# Patient Record
Sex: FEMALE | Race: WHITE | NOT HISPANIC OR LATINO | Employment: FULL TIME | URBAN - METROPOLITAN AREA
[De-identification: names, ages, dates, MRNs, and addresses within clinical notes are randomized per-mention and may not be internally consistent; named-entity substitution may affect disease eponyms.]

---

## 2017-02-01 DIAGNOSIS — N80.9 ENDOMETRIOSIS: Primary | ICD-10-CM

## 2017-02-01 DIAGNOSIS — E03.9 HYPOTHYROIDISM: ICD-10-CM

## 2017-02-01 DIAGNOSIS — Z13.6 CARDIOVASCULAR SCREENING; LDL GOAL LESS THAN 160: ICD-10-CM

## 2017-02-01 DIAGNOSIS — I10 HTN (HYPERTENSION): ICD-10-CM

## 2017-02-01 RX ORDER — HYDROCHLOROTHIAZIDE 25 MG/1
25 TABLET ORAL DAILY
Qty: 90 TABLET | Refills: 0 | Status: SHIPPED | OUTPATIENT
Start: 2017-02-01 | End: 2017-06-21

## 2017-02-01 NOTE — TELEPHONE ENCOUNTER
ethynodiol-ethinyl estradiol (ZOVIA 1/35E, 28,) 1-35 MG-MCG per tablet      Last Written Prescription Date: 6/16/16  Last Fill Quantity: 112,  # refills: 2   Last Office Visit with [facility], P or [facility] prescribing provider: 2/22/16                                         Next 5 appointments (look out 90 days)     Mar 08, 2017  8:30 AM   [facility]    [address] [facility] [address] [phone]

## 2017-02-01 NOTE — TELEPHONE ENCOUNTER
hydrochlorothiazide (HYDRODIURIL) 25 MG tablet      Last Written Prescription Date: 8/22/16  Last Fill Quantity: 90, # refills: 1  Last Office Visit with [facility], [facility] or [facility] prescribing provider: 2/22/16   Next 5 appointments (look out 90 days)     Mar 08, 2017  8:30 AM   [facility]    [address] [address]   [phone]                   POTASSIUM   Date Value Ref Range Status   02/22/2016 3.6 3.4 - 5.3 mmol/L Final     CREATININE   Date Value Ref Range Status   02/22/2016 0.92 0.52 - 1.04 mg/dL Final     BP Readings from Last 3 Encounters:   04/18/16 148/80   02/22/16 127/83   02/11/15 120/82

## 2017-02-01 NOTE — TELEPHONE ENCOUNTER
HYDROCHLOROTHIAZIDE Medication is being filled for 1 time refill only due to: Patient will be due for fasting lab. Patient has 3/8/17 appointment with Dr. Sanz. Routing    ethynodiol-ethinyl estradiol      refill request to provider for review/approval because:  I am not sure so would like Provider to fill.  Ari Brown RN

## 2017-02-02 RX ORDER — ETHYNODIOL DIACETATE AND ETHINYL ESTRADIOL 1 MG-35MCG
KIT ORAL
Qty: 112 TABLET | Refills: 0 | Status: SHIPPED | OUTPATIENT
Start: 2017-02-02 | End: 2017-11-13

## 2017-02-27 DIAGNOSIS — E03.9 HYPOTHYROIDISM: Primary | ICD-10-CM

## 2017-02-27 NOTE — TELEPHONE ENCOUNTER
levothyroxine (LEVOXYL) 50 MCG tablet     Last Written Prescription Date: 3/23/16  Last Quantity: 90, # refills: 3  Last Office Visit with G, P or [facility] prescribing provider: 2/22/16   Next 5 appointments (look out 90 days)     Mar 08, 2017  8:30 AM CST   PHYSICAL with Nancy Sanz MD   [facility]    [address] [facility] [address]   [phone]                   TSH   Date Value Ref Range Status   02/22/2016 1.24 0.40 - 4.00 mU/L Final

## 2017-02-28 RX ORDER — LEVOTHYROXINE SODIUM 50 UG/1
50 TABLET ORAL DAILY
Qty: 90 TABLET | Refills: 0 | Status: SHIPPED | OUTPATIENT
Start: 2017-02-28 | End: 2017-05-29

## 2017-02-28 NOTE — TELEPHONE ENCOUNTER
Medication is being filled for 1 time refill only due to:  Patient needs labs thyroid, lipids, . Future labs ordered yes. Patient needs to be seen because it has been more than one year since last visit. Needs bmp and microalbumin too. Has 3/8/17 appointment scheduled with Dr. Sanz.  Ari Brown RN

## 2017-03-08 ENCOUNTER — TRANSFERRED RECORDS (OUTPATIENT)
Dept: HEALTH INFORMATION MANAGEMENT | Facility: CLINIC | Age: 52
End: 2017-03-08

## 2017-03-08 ENCOUNTER — OFFICE VISIT (OUTPATIENT)
Dept: FAMILY MEDICINE | Facility: CLINIC | Age: 52
End: 2017-03-08
Payer: COMMERCIAL

## 2017-03-08 VITALS
TEMPERATURE: 98.6 F | WEIGHT: 125 LBS | HEIGHT: 65 IN | HEART RATE: 67 BPM | SYSTOLIC BLOOD PRESSURE: 111 MMHG | BODY MASS INDEX: 20.83 KG/M2 | DIASTOLIC BLOOD PRESSURE: 77 MMHG

## 2017-03-08 DIAGNOSIS — Z00.00 ROUTINE GENERAL MEDICAL EXAMINATION AT A HEALTH CARE FACILITY: Primary | ICD-10-CM

## 2017-03-08 DIAGNOSIS — Z12.4 SCREENING FOR MALIGNANT NEOPLASM OF CERVIX: ICD-10-CM

## 2017-03-08 DIAGNOSIS — Z11.59 NEED FOR HEPATITIS C SCREENING TEST: ICD-10-CM

## 2017-03-08 DIAGNOSIS — I10 ESSENTIAL HYPERTENSION: ICD-10-CM

## 2017-03-08 DIAGNOSIS — E03.9 ACQUIRED HYPOTHYROIDISM: ICD-10-CM

## 2017-03-08 DIAGNOSIS — Z13.220 SCREENING FOR LIPID DISORDERS: ICD-10-CM

## 2017-03-08 DIAGNOSIS — Z12.31 VISIT FOR SCREENING MAMMOGRAM: ICD-10-CM

## 2017-03-08 LAB
ANION GAP SERPL CALCULATED.3IONS-SCNC: 6 MMOL/L (ref 3–14)
BUN SERPL-MCNC: 11 MG/DL (ref 7–30)
CALCIUM SERPL-MCNC: 8.6 MG/DL (ref 8.5–10.1)
CHLORIDE SERPL-SCNC: 98 MMOL/L (ref 94–109)
CHOLEST SERPL-MCNC: 187 MG/DL
CO2 SERPL-SCNC: 30 MMOL/L (ref 20–32)
CREAT SERPL-MCNC: 0.86 MG/DL (ref 0.52–1.04)
CREAT UR-MCNC: 57 MG/DL
GFR SERPL CREATININE-BSD FRML MDRD: 70 ML/MIN/1.7M2
GLUCOSE SERPL-MCNC: 87 MG/DL (ref 70–99)
HDLC SERPL-MCNC: 71 MG/DL
LDLC SERPL CALC-MCNC: 96 MG/DL
MICROALBUMIN UR-MCNC: <5 MG/L
MICROALBUMIN/CREAT UR: NORMAL MG/G CR (ref 0–25)
NONHDLC SERPL-MCNC: 116 MG/DL
POTASSIUM SERPL-SCNC: 3.1 MMOL/L (ref 3.4–5.3)
SODIUM SERPL-SCNC: 134 MMOL/L (ref 133–144)
TRIGL SERPL-MCNC: 99 MG/DL
TSH SERPL DL<=0.005 MIU/L-ACNC: 1.48 MU/L (ref 0.4–4)

## 2017-03-08 PROCEDURE — 82043 UR ALBUMIN QUANTITATIVE: CPT | Performed by: FAMILY MEDICINE

## 2017-03-08 PROCEDURE — 99396 PREV VISIT EST AGE 40-64: CPT | Performed by: FAMILY MEDICINE

## 2017-03-08 PROCEDURE — 80061 LIPID PANEL: CPT | Performed by: FAMILY MEDICINE

## 2017-03-08 PROCEDURE — 80048 BASIC METABOLIC PNL TOTAL CA: CPT | Performed by: FAMILY MEDICINE

## 2017-03-08 PROCEDURE — 87624 HPV HI-RISK TYP POOLED RSLT: CPT | Performed by: FAMILY MEDICINE

## 2017-03-08 PROCEDURE — 84443 ASSAY THYROID STIM HORMONE: CPT | Performed by: FAMILY MEDICINE

## 2017-03-08 PROCEDURE — G0145 SCR C/V CYTO,THINLAYER,RESCR: HCPCS | Performed by: FAMILY MEDICINE

## 2017-03-08 PROCEDURE — 36415 COLL VENOUS BLD VENIPUNCTURE: CPT | Performed by: FAMILY MEDICINE

## 2017-03-08 NOTE — MR AVS SNAPSHOT
After Visit Summary   3/8/2017    Carie Samson    MRN: [id number]           Patient Information     Date Of Birth          1965        Visit Information        Provider Department      3/8/2017 8:30 AM Nancy Sanz MD [facility]        Today's Diagnoses     Routine general medical examination at a health care facility    -  1    Visit for screening mammogram        Screening for malignant neoplasm of cervix        Need for hepatitis C screening test        Essential hypertension        Acquired hypothyroidism        Screening for lipid disorders          Care Instructions      Preventive Health Recommendations  Female Ages 50 - 64    Yearly exam: See your health care provider every year in order to  o Review health changes.   o Discuss preventive care.    o Review your medicines if your doctor has prescribed any.      Get a Pap test every three years (unless you have an abnormal result and your provider advises testing more often).    If you get Pap tests with HPV test, you only need to test every 5 years, unless you have an abnormal result.     You do not need a Pap test if your uterus was removed (hysterectomy) and you have not had cancer.    You should be tested each year for STDs (sexually transmitted diseases) if you're at risk.     Have a mammogram every 1 to 2 years.    Have a colonoscopy at age 50, or have a yearly FIT test (stool test). These exams screen for colon cancer.      Have a cholesterol test every 5 years, or more often if advised.    Have a diabetes test (fasting glucose) every three years. If you are at risk for diabetes, you should have this test more often.     If you are at risk for osteoporosis (brittle bone disease), think about having a bone density scan (DEXA).    Shots: Get a flu shot each year. Get a tetanus shot every 10 years.    Nutrition:     Eat at least 5 servings of fruits and vegetables each day.    Eat whole-grain bread, whole-wheat  pasta and brown rice instead of white grains and rice.    Talk to your provider about Calcium and Vitamin D.     Lifestyle    Exercise at least 150 minutes a week (30 minutes a day, 5 days a week). This will help you control your weight and prevent disease.    Limit alcohol to one drink per day.    No smoking.     Wear sunscreen to prevent skin cancer.     See your dentist every six months for an exam and cleaning.    See your eye doctor every 1 to 2 years.        Go off the birth control pills and see how things are going. Send me a mychart if you feel you need to go back on something. I would recommend trying hormone replacement to see if that is enough to control the symptoms as it is a lower dose of medication.  If the blood pressure goes down off of the pills then you can try going off the blood pressure med. If the blood pressure goes back up though, please restart the medication.         Follow-ups after your visit        Future tests that were ordered for you today     Open Future Orders        Priority Expected Expires Ordered    MA SCREENING DIGITAL BILAT - Future  (s+30) Routine  3/8/2018 3/8/2017            Who to contact     Normal or non-critical lab and imaging results will be communicated to you by ClickDiagnosticshart, letter or phone within 4 business days after the clinic has received the results. If you do not hear from us within 7 days, please contact the clinic through Betifyt or phone. If you have a critical or abnormal lab result, we will notify you by phone as soon as possible.  Submit refill requests through Bucmi or call your pharmacy and they will forward the refill request to us. Please allow 3 business days for your refill to be completed.          If you need to speak with a  for additional information , please call: [phone]             Additional Information About Your Visit        Bucmi Information     Bucmi gives you secure access to your electronic health record. If  you see a primary care provider, you can also send messages to your care team and make appointments. If you have questions, please call your primary care clinic.  If you do not have a primary care provider, please call [phone] and they will assist you.        Care EveryWhere ID     This is your Care EveryWhere ID. This could be used by other organizations to access your [facility] medical records  [id number]         Blood Pressure from Last 3 Encounters:   04/18/16 148/80   02/22/16 127/83   02/11/15 120/82    Weight from Last 3 Encounters:   04/18/16 125 lb (56.7 kg)   02/22/16 130 lb 4.8 oz (59.1 kg)   02/11/15 130 lb (59 kg)              We Performed the Following     Albumin Random Urine Quantitative     BASIC METABOLIC PANEL     HPV High Risk Types DNA Cervical     Lipid panel reflex to direct LDL     Pap imaged thin layer screen with HPV - recommended age 30 - 65 years (select HPV order below)     TSH with free T4 reflex        Primary Care Provider Office Phone # Fax #    Nancy Sanz [phone]       [facility] [address] [facility] [address]        Thank you!     Thank you for choosing [facility]  for your care. Our goal is always to provide you with excellent care. Hearing back from our patients is one way we can continue to improve our services. Please take a few minutes to complete the written survey that you may receive in the mail after your visit with us. Thank you!             Your Updated Medication List - Protect others around you: Learn how to safely use, store and throw away your medicines at www.disposemymeds.org.          This list is accurate as of: 3/8/17  9:27 AM.  Always use your most recent med list.                   Brand Name Dispense Instructions for use    aspirin 81 MG tablet      1 TABLET DAILY       CITRACAL OR      1 tablet daily       CLARITIN PO      Take  by mouth daily.       ethynodiol-ethinyl estradiol 1-35 MG-MCG per  tablet    ZOVIA 1/35E (66)    112 tablet    Take  by mouth daily. Continuously       hydrochlorothiazide 25 MG tablet    HYDRODIURIL    90 tablet    Take 1 tablet (25 mg) by mouth daily       levothyroxine 50 MCG tablet    LEVOXYL    90 tablet    Take 1 tablet (50 mcg) by mouth daily

## 2017-03-08 NOTE — NURSING NOTE
"Chief Complaint   Patient presents with     Physical       Initial /77 (BP Location: Right arm, Cuff Size: Adult Regular)  Pulse 67  Temp 98.6  F (37  C) (Tympanic)  Ht 5' 5\" (1.651 m)  Wt 125 lb (56.7 kg)  BMI 20.8 kg/m2 Estimated body mass index is 20.8 kg/(m^2) as calculated from the following:    Height as of this encounter: 5' 5\" (1.651 m).    Weight as of this encounter: 125 lb (56.7 kg).  Medication Reconciliation: complete     Lizzy Glass CMA    "

## 2017-03-08 NOTE — PATIENT INSTRUCTIONS
Preventive Health Recommendations  Female Ages 50 - 64    Yearly exam: See your health care provider every year in order to  o Review health changes.   o Discuss preventive care.    o Review your medicines if your doctor has prescribed any.      Get a Pap test every three years (unless you have an abnormal result and your provider advises testing more often).    If you get Pap tests with HPV test, you only need to test every 5 years, unless you have an abnormal result.     You do not need a Pap test if your uterus was removed (hysterectomy) and you have not had cancer.    You should be tested each year for STDs (sexually transmitted diseases) if you're at risk.     Have a mammogram every 1 to 2 years.    Have a colonoscopy at age 50, or have a yearly FIT test (stool test). These exams screen for colon cancer.      Have a cholesterol test every 5 years, or more often if advised.    Have a diabetes test (fasting glucose) every three years. If you are at risk for diabetes, you should have this test more often.     If you are at risk for osteoporosis (brittle bone disease), think about having a bone density scan (DEXA).    Shots: Get a flu shot each year. Get a tetanus shot every 10 years.    Nutrition:     Eat at least 5 servings of fruits and vegetables each day.    Eat whole-grain bread, whole-wheat pasta and brown rice instead of white grains and rice.    Talk to your provider about Calcium and Vitamin D.     Lifestyle    Exercise at least 150 minutes a week (30 minutes a day, 5 days a week). This will help you control your weight and prevent disease.    Limit alcohol to one drink per day.    No smoking.     Wear sunscreen to prevent skin cancer.     See your dentist every six months for an exam and cleaning.    See your eye doctor every 1 to 2 years.        Go off the birth control pills and see how things are going. Send me a mychart if you feel you need to go back on something. I would recommend trying hormone  replacement to see if that is enough to control the symptoms as it is a lower dose of medication.  If the blood pressure goes down off of the pills then you can try going off the blood pressure med. If the blood pressure goes back up though, please restart the medication.

## 2017-03-08 NOTE — PROGRESS NOTES
SUBJECTIVE:     CC: Carie Samson is an 51 year old woman who presents for preventive health visit.     Answers for HPI/ROS submitted by the patient on 3/5/2017   Annual Exam:  Getting at least 3 servings of Calcium per day:: Yes  Bi-annual eye exam:: Yes  Dental care twice a year:: Yes  Sleep apnea or symptoms of sleep apnea:: None  Diet:: Regular (no restrictions)  Frequency of exercise:: 4-5 days/week  Taking medications regularly:: Yes  Medication side effects:: None  Additional concerns today:: No  Q1: Little interest or pleasure in doing things: 0=Not at all  Q2: Feeling down, depressed or hopeless: 0=Not at all  PHQ-2 Score: 0  Duration of exercise:: 30-45 minutes      Today's PHQ-2 Score:   PHQ-2 ( 1999 [employer]) 3/5/2017 2/22/2016   Q1: Little interest or pleasure in doing things - 0   Q2: Feeling down, depressed or hopeless - 0   PHQ-2 Score - 0   Little interest or pleasure in doing things Not at all -   Feeling down, depressed or hopeless Not at all -   PHQ-2 Score 0 -       Abuse: Current or Past(Physical, Sexual or Emotional)- No  Do you feel safe in your environment - Yes    Social History   Substance Use Topics     Smoking status: Never Smoker     Smokeless tobacco: Never Used     Alcohol use Yes      Comment: Weekends     The patient does not drink >3 drinks per day nor >7 drinks per week.    Recent Labs   Lab Test  02/22/16   0923  02/11/15   0914  02/10/14   0931   CHOL  214*  162  192   HDL  74  66  74   LDL  109*  74  89   TRIG  154*  111  151*   CHOLHDLRATIO   --   2.5  3.0   NHDL  140*   --    --      Has a cold she has been sick   Reviewed orders with patient.  Reviewed health maintenance and updated orders accordingly - Yes    Mammo Decision Support:  Patient over age 50, mutual decision to screen reflected in health maintenance.    Pertinent mammograms are reviewed under the imaging tab.    Lab Results   Component Value Date    PAP NIL 02/10/2014    PAP NIL 01/26/2011     History of  "abnormal Pap smear: NO - age 30-65 PAP every 5 years with negative HPV co-testing recommended    Reviewed and updated as needed this visit by clinical staff  Tobacco  Allergies  Meds  Med Hx  Surg Hx  Fam Hx  Soc Hx        Reviewed and updated as needed this visit by Provider            ROS:  C: NEGATIVE for fever, chills, change in weight  I: NEGATIVE for worrisome rashes, moles or lesions  E: NEGATIVE for vision changes or irritation  ENT: NEGATIVE for ear, mouth and throat problems  R: NEGATIVE for significant cough or SOB  B: NEGATIVE for masses, tenderness or discharge  CV: NEGATIVE for chest pain, palpitations or peripheral edema  GI: NEGATIVE for nausea, abdominal pain, heartburn, or change in bowel habits  : NEGATIVE for unusual urinary or vaginal symptoms. Periods are regular.  M: NEGATIVE for significant arthralgias or myalgia  N: NEGATIVE for weakness, dizziness or paresthesias  P: NEGATIVE for changes in mood or affect    Problem list, Medication list, Allergies, and Medical/Social/Surgical histories reviewed in [facility] and updated as appropriate.  OBJECTIVE:     /77 (BP Location: Right arm, Cuff Size: Adult Regular)  Pulse 67  Temp 98.6  F (37  C) (Tympanic)  Ht 5' 5\" (1.651 m)  Wt 125 lb (56.7 kg)  BMI 20.8 kg/m2  EXAM:  GENERAL: healthy, alert and no distress  HENT: ear canals and TM's normal, nose and mouth without ulcers or lesions  NECK: no adenopathy, no asymmetry, masses, or scars and thyroid normal to palpation  RESP: lungs clear to auscultation - no rales, rhonchi or wheezes  BREAST: normal without masses, tenderness or nipple discharge and no palpable axillary masses or adenopathy  CV: regular rate and rhythm, normal S1 S2, no S3 or S4, no murmur, click or rub, no peripheral edema and peripheral pulses strong  ABDOMEN: soft, nontender, no hepatosplenomegaly, no masses and bowel sounds normal   (female): normal female external genitalia, normal urethral meatus, vaginal mucosa pink, " "moist, well rugated, and normal cervix/adnexa/uterus without masses or discharge  MS: no gross musculoskeletal defects noted, no edema  SKIN: no suspicious lesions or rashes  NEURO: Normal strength and tone, mentation intact and speech normal  PSYCH: mentation appears normal, affect normal/bright    ASSESSMENT/PLAN:         ICD-10-CM    1. Routine general medical examination at a health care facility Z00.00    2. Visit for screening mammogram Z12.31 MA SCREENING DIGITAL BILAT - Future  (s+30)   3. Screening for malignant neoplasm of cervix Z12.4 Pap imaged thin layer screen with HPV - recommended age 30 - 65 years (select HPV order below)     HPV High Risk Types DNA Cervical   4. Need for hepatitis C screening test Z11.59    5. Essential hypertension I10 BASIC METABOLIC PANEL     Albumin Random Urine Quantitative   6. Acquired hypothyroidism E03.9 TSH with free T4 reflex   7. Screening for lipid disorders Z13.220 Lipid panel reflex to direct LDL       Go off the birth control pills and see how things are going. Send me a mychart if you feel you need to go back on something. I would recommend trying hormone replacement to see if that is enough to control the symptoms as it is a lower dose of medication.  If the blood pressure goes down off of the pills then you can try going off the blood pressure med. If the blood pressure goes back up though, please restart the medication.   COUNSELING:   Reviewed preventive health counseling, as reflected in patient instructions         reports that she has never smoked. She has never used smokeless tobacco.    Estimated body mass index is 20.8 kg/(m^2) as calculated from the following:    Height as of this encounter: 5' 5\" (1.651 m).    Weight as of this encounter: 125 lb (56.7 kg).       Counseling Resources:  ATP IV Guidelines  Pooled Cohorts Equation Calculator  Breast Cancer Risk Calculator  FRAX Risk Assessment  ICSI Preventive Guidelines  Dietary Guidelines for Americans, " 2010  USDA's MyPlate  ASA Prophylaxis  Lung CA Screening    Nancy Sanz MD  [facility]

## 2017-03-10 LAB
COPATH REPORT: NORMAL
PAP: NORMAL

## 2017-03-13 LAB
FINAL DIAGNOSIS: NORMAL
HPV HR 12 DNA CVX QL NAA+PROBE: NEGATIVE
HPV16 DNA SPEC QL NAA+PROBE: NEGATIVE
HPV18 DNA SPEC QL NAA+PROBE: NEGATIVE
SPECIMEN DESCRIPTION: NORMAL

## 2017-05-29 DIAGNOSIS — E03.9 HYPOTHYROIDISM: ICD-10-CM

## 2017-05-30 NOTE — TELEPHONE ENCOUNTER
L-THYROXINE TABS 50MCG       Last Written Prescription Date: 2/28/17  Last Quantity: 90, # refills: 0  Last Office Visit with [facility] or [facility] prescribing provider: 3/8/17        TSH   Date Value Ref Range Status   03/08/2017 1.48 0.40 - 4.00 mU/L Final

## 2017-05-31 RX ORDER — LEVOTHYROXINE SODIUM 50 UG/1
50 TABLET ORAL DAILY
Qty: 90 TABLET | Refills: 2 | Status: SHIPPED | OUTPATIENT
Start: 2017-05-31 | End: 2018-02-25

## 2017-05-31 NOTE — TELEPHONE ENCOUNTER
Prescription approved per [facility] Refill Protocol.  Ari Brown RN

## 2017-06-21 DIAGNOSIS — I10 BENIGN ESSENTIAL HYPERTENSION: ICD-10-CM

## 2017-06-21 RX ORDER — HYDROCHLOROTHIAZIDE 25 MG/1
TABLET ORAL
Qty: 90 TABLET | Refills: 0 | Status: SHIPPED | OUTPATIENT
Start: 2017-06-21 | End: 2017-08-29

## 2017-06-21 NOTE — TELEPHONE ENCOUNTER
HYDROCHLOROTHIAZIDE TAB 25MG        Last Written Prescription Date: 2/1/17  Last Fill Quantity: 90, # refills: 0  Last Office Visit with G, P or [facility] prescribing provider: 3/8/17       Potassium   Date Value Ref Range Status   03/08/2017 3.1 (L) 3.4 - 5.3 mmol/L Final     Creatinine   Date Value Ref Range Status   03/08/2017 0.86 0.52 - 1.04 mg/dL Final     BP Readings from Last 3 Encounters:   03/08/17 111/77   04/18/16 148/80   02/22/16 127/83

## 2017-08-29 DIAGNOSIS — I10 BENIGN ESSENTIAL HYPERTENSION: ICD-10-CM

## 2017-08-29 NOTE — TELEPHONE ENCOUNTER
HCTZ      Last Written Prescription Date: 06/21/2017  Last Fill Quantity: 90, # refills: 0  [facility] with [facility] or [facility] prescribing provider: 03/08/2017       Potassium   Date Value Ref Range Status   03/08/2017 3.1 (L) 3.4 - 5.3 mmol/L Final     Creatinine   Date Value Ref Range Status   03/08/2017 0.86 0.52 - 1.04 mg/dL Final     BP Readings from Last 3 Encounters:   03/08/17 111/77   04/18/16 148/80   02/22/16 127/83     Robert OAKES (R)

## 2017-08-30 RX ORDER — HYDROCHLOROTHIAZIDE 25 MG/1
TABLET ORAL
Qty: 90 TABLET | Refills: 2 | Status: SHIPPED | OUTPATIENT
Start: 2017-08-30 | End: 2018-03-21

## 2017-11-12 ENCOUNTER — MYC MEDICAL ADVICE (OUTPATIENT)
Dept: FAMILY MEDICINE | Facility: CLINIC | Age: 52
End: 2017-11-12

## 2017-11-12 DIAGNOSIS — N80.9 ENDOMETRIOSIS: ICD-10-CM

## 2017-11-13 RX ORDER — ETHYNODIOL DIACETATE AND ETHINYL ESTRADIOL 1 MG-35MCG
KIT ORAL
Qty: 84 TABLET | Refills: 1 | Status: SHIPPED | OUTPATIENT
Start: 2017-11-13 | End: 2018-03-19

## 2018-03-19 ENCOUNTER — TELEPHONE (OUTPATIENT)
Dept: FAMILY MEDICINE | Facility: CLINIC | Age: 53
End: 2018-03-19

## 2018-03-19 DIAGNOSIS — N80.9 ENDOMETRIOSIS: ICD-10-CM

## 2018-03-19 RX ORDER — ETHYNODIOL DIACETATE AND ETHINYL ESTRADIOL 1 MG-35MCG
KIT ORAL
Qty: 30 TABLET | Refills: 0 | Status: SHIPPED | OUTPATIENT
Start: 2018-03-19 | End: 2018-03-21

## 2018-03-19 NOTE — TELEPHONE ENCOUNTER
Pt is requesting 1 month supply. She has an appt Wednesday but needs right away. Please call pt if filled.      ethynodiol-ethinyl estradiol (ZOVIA 1/35E, 28,) 1-35 MG-MCG per tablet  Last Written Prescription Date:  11/13/17  Last Fill Quantity: 84,   # refills: 1  Last Office Visit: 3/8/17  Future Office visit:    Next 5 appointments (look out 90 days)     Mar 21, 2018  9:00 AM CDT   MyChart Physical Adult with Nancy Sanz MD   [facility]    [address] [facility] [address]   [phone]

## 2018-03-21 ENCOUNTER — OFFICE VISIT (OUTPATIENT)
Dept: FAMILY MEDICINE | Facility: CLINIC | Age: 53
End: 2018-03-21
Payer: COMMERCIAL

## 2018-03-21 VITALS
HEART RATE: 68 BPM | HEIGHT: 65 IN | DIASTOLIC BLOOD PRESSURE: 78 MMHG | BODY MASS INDEX: 21.96 KG/M2 | WEIGHT: 131.8 LBS | SYSTOLIC BLOOD PRESSURE: 134 MMHG

## 2018-03-21 DIAGNOSIS — Z12.31 VISIT FOR SCREENING MAMMOGRAM: ICD-10-CM

## 2018-03-21 DIAGNOSIS — N80.9 ENDOMETRIOSIS: ICD-10-CM

## 2018-03-21 DIAGNOSIS — Z00.00 ROUTINE GENERAL MEDICAL EXAMINATION AT A HEALTH CARE FACILITY: ICD-10-CM

## 2018-03-21 DIAGNOSIS — E03.4 HYPOTHYROIDISM DUE TO ACQUIRED ATROPHY OF THYROID: ICD-10-CM

## 2018-03-21 DIAGNOSIS — I10 BENIGN ESSENTIAL HYPERTENSION: ICD-10-CM

## 2018-03-21 LAB
ANION GAP SERPL CALCULATED.3IONS-SCNC: 7 MMOL/L (ref 3–14)
BUN SERPL-MCNC: 12 MG/DL (ref 7–30)
CALCIUM SERPL-MCNC: 8.7 MG/DL (ref 8.5–10.1)
CHLORIDE SERPL-SCNC: 100 MMOL/L (ref 94–109)
CO2 SERPL-SCNC: 28 MMOL/L (ref 20–32)
CREAT SERPL-MCNC: 0.86 MG/DL (ref 0.52–1.04)
CREAT UR-MCNC: 16 MG/DL
GFR SERPL CREATININE-BSD FRML MDRD: 70 ML/MIN/1.7M2
GLUCOSE SERPL-MCNC: 98 MG/DL (ref 70–99)
MICROALBUMIN UR-MCNC: <5 MG/L
MICROALBUMIN/CREAT UR: NORMAL MG/G CR (ref 0–25)
POTASSIUM SERPL-SCNC: 3.4 MMOL/L (ref 3.4–5.3)
SODIUM SERPL-SCNC: 135 MMOL/L (ref 133–144)
TSH SERPL DL<=0.005 MIU/L-ACNC: 1.17 MU/L (ref 0.4–4)

## 2018-03-21 PROCEDURE — 82043 UR ALBUMIN QUANTITATIVE: CPT | Performed by: FAMILY MEDICINE

## 2018-03-21 PROCEDURE — 99396 PREV VISIT EST AGE 40-64: CPT | Performed by: FAMILY MEDICINE

## 2018-03-21 PROCEDURE — 80048 BASIC METABOLIC PNL TOTAL CA: CPT | Performed by: FAMILY MEDICINE

## 2018-03-21 PROCEDURE — 36415 COLL VENOUS BLD VENIPUNCTURE: CPT | Performed by: FAMILY MEDICINE

## 2018-03-21 PROCEDURE — 84443 ASSAY THYROID STIM HORMONE: CPT | Performed by: FAMILY MEDICINE

## 2018-03-21 RX ORDER — LEVOTHYROXINE SODIUM 50 UG/1
50 TABLET ORAL DAILY
Qty: 90 TABLET | Refills: 3 | Status: SHIPPED | OUTPATIENT
Start: 2018-03-21 | End: 2019-04-28

## 2018-03-21 RX ORDER — ETHYNODIOL DIACETATE AND ETHINYL ESTRADIOL 1 MG-35MCG
KIT ORAL
Qty: 120 TABLET | Refills: 3 | Status: SHIPPED | OUTPATIENT
Start: 2018-03-21 | End: 2018-08-20

## 2018-03-21 RX ORDER — HYDROCHLOROTHIAZIDE 25 MG/1
25 TABLET ORAL DAILY
Qty: 90 TABLET | Refills: 3 | Status: SHIPPED | OUTPATIENT
Start: 2018-03-21 | End: 2019-05-13

## 2018-03-21 RX ORDER — ETHYNODIOL DIACETATE AND ETHINYL ESTRADIOL 1 MG-35MCG
KIT ORAL
Qty: 84 TABLET | Refills: 1 | Status: ON HOLD | OUTPATIENT
Start: 2018-03-21 | End: 2018-11-13

## 2018-03-21 NOTE — TELEPHONE ENCOUNTER
"DEBBINOR TABS JESUS 28'S 1/35 **Duplicate**        Last Written Prescription Date:  3/21/18  Last Fill Quantity: 120,   # refills: 3  Last Office Visit: 3/21/18  Future Office visit:       Requested Prescriptions   Pending Prescriptions Disp Refills     KELNOR 1/35 1-35 MG-MCG per tablet [Pharmacy Med Name: DEBBINOR MICHAEL LEE 28'S 1/35] 84 tablet 1     Sig: TAKE 1 TABLET DAILY CONTINUOUSLY    Contraceptives Protocol Passed    3/21/2018 11:23 AM       Passed - Patient is not a current smoker if age is 35 or older       Passed - Recent (12 mo) or future (30 days) visit within the authorizing provider's specialty    Patient had office visit in the last 12 months or has a visit in the next 30 days with authorizing provider or within the authorizing provider's specialty.  See \"Patient Info\" tab in inbasket, or \"Choose Columns\" in Meds & Orders section of the refill encounter.           Passed - No active pregnancy on record       Passed - No positive pregnancy test in past 12 months          "

## 2018-03-21 NOTE — NURSING NOTE
"Chief Complaint   Patient presents with     Physical       Initial /78  Pulse 68  Ht 5' 5.25\" (1.657 m)  Wt 131 lb 12.8 oz (59.8 kg)  BMI 21.76 kg/m2 Estimated body mass index is 21.76 kg/(m^2) as calculated from the following:    Height as of this encounter: 5' 5.25\" (1.657 m).    Weight as of this encounter: 131 lb 12.8 oz (59.8 kg).  Medication Reconciliation: complete   Lizzy Glass CMA    "

## 2018-03-21 NOTE — PROGRESS NOTES
SUBJECTIVE:   CC: Carie Samson is an 52 year old woman who presents for preventive health visit.   Answers for HPI/ROS submitted by the patient on 3/18/2018   Annual Exam:  Getting at least 3 servings of Calcium per day:: Yes  Bi-annual eye exam:: Yes  Dental care twice a year:: Yes  Sleep apnea or symptoms of sleep apnea:: Daytime drowsiness  Diet:: Regular (no restrictions)  Frequency of exercise:: 4-5 days/week  Taking medications regularly:: Yes  Medication side effects:: None  Additional concerns today:: No  PHQ-2 Score: 0  Duration of exercise:: 15-30 minutes    Check labs today     Today's PHQ-2 Score:   PHQ-2 ( 1999 Pf[employer]) 3/18/2018 3/5/2017   Q1: Little interest or pleasure in doing things 0 -   Q2: Feeling down, depressed or hopeless 0 -   PHQ-2 Score 0 -   Q1: Little interest or pleasure in doing things Not at all Not at all   Q2: Feeling down, depressed or hopeless Not at all Not at all   PHQ-2 Score 0 0       Abuse: Current or Past(Physical, Sexual or Emotional)- No  Do you feel safe in your environment - Yes    Social History   Substance Use Topics     Smoking status: Never Smoker     Smokeless tobacco: Never Used     Alcohol use Yes      Comment: Weekends     If you drink alcohol do you typically have >3 drinks per day or >7 drinks per week? No                     Reviewed orders with patient.  Reviewed health maintenance and updated orders accordingly - Yes  BP Readings from Last 3 Encounters:   03/21/18 134/78   03/08/17 111/77   04/18/16 148/80    Wt Readings from Last 3 Encounters:   03/21/18 131 lb 12.8 oz (59.8 kg)   03/08/17 125 lb (56.7 kg)   04/18/16 125 lb (56.7 kg)                  Patient Active Problem List   Diagnosis     HTN (hypertension)     CARDIOVASCULAR SCREENING; LDL GOAL LESS THAN 160     Endometriosis     Insomnia     Essential hypertension     Acquired hypothyroidism     Past Surgical History:   Procedure Laterality Date     COLONOSCOPY N/A 4/18/2016    Procedure:  COLONOSCOPY;  Surgeon: Mason Tee MD;  Location: [facility]     GYN SURGERY      Endometriosis- right ovary       Social History   Substance Use Topics     Smoking status: Never Smoker     Smokeless tobacco: Never Used     Alcohol use Yes      Comment: Weekends     Family History   Problem Relation Age of Onset     Hypertension Father      Alcohol/Drug Father      CANCER Father 69     Melanoma     Other Cancer Father      melanoma     DIABETES Paternal Grandmother      Hypertension Paternal Grandmother      Allergies Mother      GASTROINTESTINAL DISEASE Mother      CANCER Mother      lymphoma/leukemia     Other Cancer Mother      lymphoma & leukemia     CANCER Maternal Grandmother      Circulatory Paternal Grandfather      Gynecology Daughter      Other - See Comments Daughter 22     junctional rhythm--heart     GASTROINTESTINAL DISEASE Brother      Hypertension Brother      Allergies Son      CEREBROVASCULAR DISEASE No family hx of      C.A.D. No family hx of      Breast Cancer No family hx of      Cancer - colorectal No family hx of      Prostate Cancer No family hx of            Patient over age 50, mutual decision to screen reflected in health maintenance.    Pertinent mammograms are reviewed under the imaging tab.    Lab Results   Component Value Date    PAP NIL 03/08/2017    PAP NIL 02/10/2014    PAP NIL 01/26/2011     History of abnormal Pap smear: NO - age 30-65 PAP every 5 years with negative HPV co-testing recommended    Reviewed and updated as needed this visit by clinical staff  Tobacco  Allergies  Meds  Med Hx  Surg Hx  Fam Hx  Soc Hx        Reviewed and updated as needed this visit by Provider        Past Medical History:   Diagnosis Date     Hypertension      Thyroid disease         ROS:  C: NEGATIVE for fever, chills, change in weight  I: NEGATIVE for worrisome rashes, moles or lesions  E: NEGATIVE for vision changes or irritation  ENT: NEGATIVE for ear, mouth and throat problems  R: NEGATIVE  "for significant cough or SOB  B: NEGATIVE for masses, tenderness or discharge  CV: NEGATIVE for chest pain, palpitations or peripheral edema  GI: NEGATIVE for nausea, abdominal pain, heartburn, or change in bowel habits  : NEGATIVE for unusual urinary or vaginal symptoms. Periods are regular.  M: NEGATIVE for significant arthralgias or myalgia  N: NEGATIVE for weakness, dizziness or paresthesias  P: NEGATIVE for changes in mood or affect    OBJECTIVE:   /78  Pulse 68  Ht 5' 5.25\" (1.657 m)  Wt 131 lb 12.8 oz (59.8 kg)  BMI 21.76 kg/m2  EXAM:  GENERAL: healthy, alert and no distress  HENT: ear canals and TM's normal, nose and mouth without ulcers or lesions  NECK: no adenopathy, no asymmetry, masses, or scars and thyroid normal to palpation  RESP: lungs clear to auscultation - no rales, rhonchi or wheezes  BREAST: normal without masses, tenderness or nipple discharge and no palpable axillary masses or adenopathy  CV: regular rate and rhythm, normal S1 S2, no S3 or S4, no murmur, click or rub, no peripheral edema and peripheral pulses strong  ABDOMEN: soft, nontender, no hepatosplenomegaly, no masses and bowel sounds normal  MS: no gross musculoskeletal defects noted, no edema  SKIN: no suspicious lesions or rashes  NEURO: Normal strength and tone, mentation intact and speech normal  PSYCH: mentation appears normal, affect normal/bright    ASSESSMENT/PLAN:   1. Routine general medical examination at a health care facility      2. Visit for screening mammogram    - MA Screening Digital Bilateral; Future    3. Endometriosis    - ethynodiol-ethinyl estradiol (ZOVIA 1/35E, 28,) 1-35 MG-MCG per tablet; Take  by mouth daily. Continuously  Dispense: 120 tablet; Refill: 3  Discussed continuing this until age 55 . She is in agreement she has no risk factors   4. Hypothyroidism due to acquired atrophy of thyroid    - levothyroxine (SYNTHROID/LEVOTHROID) 50 MCG tablet; Take 1 tablet (50 mcg) by mouth daily  Dispense: " "90 tablet; Refill: 3    5. Benign essential hypertension    - hydrochlorothiazide (HYDRODIURIL) 25 MG tablet; Take 1 tablet (25 mg) by mouth daily  Dispense: 90 tablet; Refill: 3    COUNSELING:   Reviewed preventive health counseling, as reflected in patient instructions         reports that she has never smoked. She has never used smokeless tobacco.    Estimated body mass index is 21.76 kg/(m^2) as calculated from the following:    Height as of this encounter: 5' 5.25\" (1.657 m).    Weight as of this encounter: 131 lb 12.8 oz (59.8 kg).       Counseling Resources:  ATP IV Guidelines  Pooled Cohorts Equation Calculator  Breast Cancer Risk Calculator  FRAX Risk Assessment  ICSI Preventive Guidelines  Dietary Guidelines for Americans, 2010  USDA's MyPlate  ASA Prophylaxis  Lung CA Screening    Nancy Sanz MD  [facility]  "

## 2018-03-21 NOTE — MR AVS SNAPSHOT
After Visit Summary   3/21/2018    Carie Samson    MRN: [id number]           Patient Information     Date Of Birth          1965        Visit Information        Provider Department      3/21/2018 9:00 AM Nancy Sanz MD [facility]        Today's Diagnoses     Routine general medical examination at a health care facility        Visit for screening mammogram        Endometriosis        Hypothyroidism due to acquired atrophy of thyroid        Benign essential hypertension          Care Instructions      Preventive Health Recommendations  Female Ages 50 - 64    Yearly exam: See your health care provider every year in order to  o Review health changes.   o Discuss preventive care.    o Review your medicines if your doctor has prescribed any.      Get a Pap test every three years (unless you have an abnormal result and your provider advises testing more often).    If you get Pap tests with HPV test, you only need to test every 5 years, unless you have an abnormal result.     You do not need a Pap test if your uterus was removed (hysterectomy) and you have not had cancer.    You should be tested each year for STDs (sexually transmitted diseases) if you're at risk.     Have a mammogram every 1 to 2 years.    Have a colonoscopy at age 50, or have a yearly FIT test (stool test). These exams screen for colon cancer.      Have a cholesterol test every 5 years, or more often if advised.    Have a diabetes test (fasting glucose) every three years. If you are at risk for diabetes, you should have this test more often.     If you are at risk for osteoporosis (brittle bone disease), think about having a bone density scan (DEXA).    Shots: Get a flu shot each year. Get a tetanus shot every 10 years.    Nutrition:     Eat at least 5 servings of fruits and vegetables each day.    Eat whole-grain bread, whole-wheat pasta and brown rice instead of white grains and rice.    Talk to your provider  "about Calcium and Vitamin D.     Lifestyle    Exercise at least 150 minutes a week (30 minutes a day, 5 days a week). This will help you control your weight and prevent disease.    Limit alcohol to one drink per day.    No smoking.     Wear sunscreen to prevent skin cancer.     See your dentist every six months for an exam and cleaning.    See your eye doctor every 1 to 2 years.            Follow-ups after your visit        Who to contact     Normal or non-critical lab and imaging results will be communicated to you by [employer], letter or phone within 4 business days after the clinic has received the results. If you do not hear from us within 7 days, please contact the clinic through [employer] or phone. If you have a critical or abnormal lab result, we will notify you by phone as soon as possible.  Submit refill requests through [employer] or call your pharmacy and they will forward the refill request to us. Please allow 3 business days for your refill to be completed.          If you need to speak with a  for additional information , please call: [phone]             Additional Information About Your Visit        [employer] Information     [employer] gives you secure access to your electronic health record. If you see a primary care provider, you can also send messages to your care team and make appointments. If you have questions, please call your primary care clinic.  If you do not have a primary care provider, please call [phone] and they will assist you.        Care EveryWhere ID     This is your Care EveryWhere ID. This could be used by other organizations to access your [facility] medical records  [id number]        Your Vitals Were     Pulse Height BMI (Body Mass Index)             68 5' 5.25\" (1.657 m) 21.76 kg/m2          Blood Pressure from Last 3 Encounters:   03/21/18 134/78   03/08/17 111/77   04/18/16 148/80    Weight from Last 3 Encounters:   03/21/18 131 lb 12.8 oz (59.8 kg)   03/08/17 " 125 lb (56.7 kg)   04/18/16 125 lb (56.7 kg)              Today, you had the following     No orders found for display       Primary Care Provider Office Phone # Fax #    Nancy Sanz [phone] [address] [facility] [address]        Equal Access to Services     NEL YUN : Hadii aad ku hadasho Soomaali, waaxda luqadaha, qaybta kaalmada adeegyada, waxay idiin hayaan adetj sarabia lajulian . So [facility] [phone].    ATENCIÓN: Si habla español, tiene a hoskins disposición servicios gratuitos de asistencia lingüística. Llame al [phone].    We comply with applicable federal civil rights laws and [place] laws. We do not discriminate on the basis of race, color, national origin, age, disability, sex, sexual orientation, or gender identity.            Thank you!     Thank you for choosing [facility]  for your care. Our goal is always to provide you with excellent care. Hearing back from our patients is one way we can continue to improve our services. Please take a few minutes to complete the written survey that you may receive in the mail after your visit with us. Thank you!             Your Updated Medication List - Protect others around you: Learn how to safely use, store and throw away your medicines at www.disposemymeds.org.          This list is accurate as of 3/21/18  9:39 AM.  Always use your most recent med list.                   Brand Name Dispense Instructions for use Diagnosis    aspirin 81 MG tablet      1 TABLET DAILY        CITRACAL OR      1 tablet daily        CLARITIN PO      Take  by mouth daily.        ethynodiol-ethinyl estradiol 1-35 MG-MCG per tablet    ZOVIA 1/35E (28)    30 tablet    Take  by mouth daily. Continuously    Endometriosis       hydrochlorothiazide 25 MG tablet    HYDRODIURIL    90 tablet    TAKE 1 TABLET DAILY    Benign essential hypertension       levothyroxine 50 MCG tablet    SYNTHROID/LEVOTHROID    90 tablet    TAKE 1 TABLET DAILY     Hypothyroidism due to acquired atrophy of thyroid

## 2018-03-22 NOTE — PROGRESS NOTES
Carie,  Your lab results were normal/stable. Please feel free to my chart or call the office with questions. Nancy Sanz M.D.

## 2018-05-13 ENCOUNTER — HEALTH MAINTENANCE LETTER (OUTPATIENT)
Age: 53
End: 2018-05-13

## 2018-08-06 ASSESSMENT — ANXIETY QUESTIONNAIRES
GAD7 TOTAL SCORE: 0
5. BEING SO RESTLESS THAT IT IS HARD TO SIT STILL: NOT AT ALL
4. TROUBLE RELAXING: NOT AT ALL
GAD7 TOTAL SCORE: 0
7. FEELING AFRAID AS IF SOMETHING AWFUL MIGHT HAPPEN: NOT AT ALL
3. WORRYING TOO MUCH ABOUT DIFFERENT THINGS: NOT AT ALL
1. FEELING NERVOUS, ANXIOUS, OR ON EDGE: NOT AT ALL
2. NOT BEING ABLE TO STOP OR CONTROL WORRYING: NOT AT ALL
6. BECOMING EASILY ANNOYED OR IRRITABLE: NOT AT ALL
7. FEELING AFRAID AS IF SOMETHING AWFUL MIGHT HAPPEN: NOT AT ALL

## 2018-08-06 ASSESSMENT — ENCOUNTER SYMPTOMS
DECREASED LIBIDO: 0
HOT FLASHES: 0

## 2018-08-07 ASSESSMENT — ANXIETY QUESTIONNAIRES: GAD7 TOTAL SCORE: 0

## 2018-08-20 ENCOUNTER — OFFICE VISIT (OUTPATIENT)
Dept: OBGYN | Facility: CLINIC | Age: 53
End: 2018-08-20
Attending: OBSTETRICS & GYNECOLOGY
Payer: COMMERCIAL

## 2018-08-20 ENCOUNTER — RADIANT APPOINTMENT (OUTPATIENT)
Dept: MAMMOGRAPHY | Facility: CLINIC | Age: 53
End: 2018-08-20
Payer: COMMERCIAL

## 2018-08-20 VITALS — BODY MASS INDEX: 21.52 KG/M2 | HEIGHT: 65 IN | WEIGHT: 129.2 LBS

## 2018-08-20 DIAGNOSIS — Z12.31 ENCOUNTER FOR SCREENING MAMMOGRAM FOR BREAST CANCER: ICD-10-CM

## 2018-08-20 DIAGNOSIS — N93.9 ABNORMAL UTERINE BLEEDING: Primary | ICD-10-CM

## 2018-08-20 PROCEDURE — G0463 HOSPITAL OUTPT CLINIC VISIT: HCPCS | Mod: 25

## 2018-08-20 PROCEDURE — 77067 SCR MAMMO BI INCL CAD: CPT

## 2018-08-20 NOTE — PROGRESS NOTES
Gynecology Clinic Note  2018    CC: Carie Samson is a 52 year old   female here to establish care and discuss heavy, irregular periods    HPI:   Patient reports she always has had heavy, irregular periods. About 15-20 years ago had an endometrioma found on U/S as part of workup for uterine bleeding, had surgery to remove it and was diagnosed at that time with endometriosis. Started on nonstop OCP shortly afterward which was effective at stopping her periods. About a year ago was having some symptoms consistent with menopause, hot flashes and night sweats, so went off OCP. Menopause symptoms resolved but period started back up a few months later.  Since that time has been having periods every 2-3 months that last up to 3 weeks at a time, heavy and irregular bleeding with painful cramps. Using 10+ tampons on heavy days. Has associated nausea, bloating, acne. Denies HA or mood symptoms. Starting taking OCP again 6 months ago but continues to have heavy, irregular periods. Would like to discuss options for reducing or stopping periods. Does not require birth control,  had a vasectomy.    Obstetric History:  , no complications during pregnancies, uncomplicated vaginal deliveries  Infertility History: None, first pregnancy after trying for a year, second immediately afterwards  Incontinence: Nothing significant, sometimes a little leakage with sneezing     Menstrual History:  Age at menarche: 14 years  LMP: Patient's last menstrual period was 2018 (exact date).   See HPI    Menopause History:  See HPI    Gynecology History:  Current contraception: OCP (estrogen/progesterone),  has a vasectomy  Previously used: Depo-Provera injections given after surgery to remove endometrioma  Complications: No complications    Sexual Activity:  Currently active: Yes  Orientation: Male  Current partners:   Past partners: 0  Dyspareunia: No  Bleeding with intercourse: No  Dysfunction:  No  Current/past abuse/assults: No     STI history: none    Gyn Surgeries: Removal of endometrioma, unsure of date (15-20 years ago)    Health Maintenance:  Pap smear history:   Most recent:  PAP   Date Value Ref Range Status   03/08/2017 NIL  Final     HPV 16 DNA   Date Value Ref Range Status   03/08/2017 Negative NEG Final     HPV 18 DNA   Date Value Ref Range Status   03/08/2017 Negative NEG Final     Other HR HPV   Date Value Ref Range Status   03/08/2017 Negative NEG Final     Prior abnormal results: No    Mammogram history:  Most recent: 3/8/2017, usually does it every 18 months  Prior abnormal results: no  Regular self breast exam: not usually    Colonoscopy history:  Most recent: 04/2016  Prior abnormal results: No    Past Medical History:   Diagnosis Date     Hypertension      Thyroid disease      Past Surgical History:   Procedure Laterality Date     COLONOSCOPY N/A 4/18/2016    Procedure: COLONOSCOPY;  Surgeon: Mason Tee MD;  Location: [facility]     GYN SURGERY      Endometriosis- right ovary     Family History   Problem Relation Age of Onset     Hypertension Father      Alcohol/Drug Father      Cancer Father 69     Melanoma     Other Cancer Father      melanoma     Diabetes Paternal Grandmother      Hypertension Paternal Grandmother      Allergies Mother      GASTROINTESTINAL DISEASE Mother      Cancer Mother      lymphoma/leukemia     Other Cancer Mother      lymphoma & leukemia     Cancer Maternal Grandmother      Circulatory Paternal Grandfather      Gynecology Daughter      Other - See Comments Daughter 22     junctional rhythm--heart     GASTROINTESTINAL DISEASE Brother      Hypertension Brother      Allergies Son      Cerebrovascular Disease No family hx of      C.A.D. No family hx of      Breast Cancer No family hx of      Cancer - colorectal No family hx of      Prostate Cancer No family hx of      Family history of uterine or ovarian cancer: no  Family history of breast cancer: no  Family  history of colon cancer: no    Social History     Social History     Marital status:      Spouse name: Damion Samson     Number of children: N/A     Years of education: N/A     Occupational History      [employer]     Social History Main Topics     Smoking status: Never Smoker     Smokeless tobacco: Never Used     Alcohol use Yes      Comment: Weekends     Drug use: No     Sexual activity: Yes     Partners: Male     Birth control/ protection: Pill, Male Surgical     Other Topics Concern     Parent/Sibling W/ Cabg, Mi Or Angioplasty Before 65f 55m? No     Social History Narrative     Current Outpatient Prescriptions   Medication Sig Dispense Refill     fluticasone (FLONASE SENSIMIST) 27.5 MCG/SPRAY spray        ASPIRIN 81 MG OR TABS 1 TABLET DAILY       hydrochlorothiazide (HYDRODIURIL) 25 MG tablet Take 1 tablet (25 mg) by mouth daily 90 tablet 3     KELNOR 1/35 1-35 MG-MCG per tablet TAKE 1 TABLET DAILY CONTINUOUSLY 84 tablet 1     levothyroxine (SYNTHROID/LEVOTHROID) 50 MCG tablet Take 1 tablet (50 mcg) by mouth daily 90 tablet 3     Loratadine (CLARITIN PO) Take  by mouth daily.       methylcellulose (CITRUCEL) 500 MG TABS tablet        [DISCONTINUED] ethynodiol-ethinyl estradiol (ZOVIA 1/35E, 28,) 1-35 MG-MCG per tablet Take  by mouth daily. Continuously 120 tablet 3     Allergies   Allergen Reactions     Amoxicillin Rash      Immunization History   Administered Date(s) Administered     Influenza (IIV3) PF 11/10/2010, 02/01/2012     TDAP Vaccine (Adacel) 04/24/2013     Answers for HPI/ROS submitted by the patient on 8/6/2018, reviewed and unchanged today   BENNETT 7 TOTAL SCORE: 0  PHQ-2 Score: 0  General Symptoms: No  Skin Symptoms: No  HENT Symptoms: No  EYE SYMPTOMS: No  HEART SYMPTOMS: No  LUNG SYMPTOMS: No  INTESTINAL SYMPTOMS: No  URINARY SYMPTOMS: No  GYNECOLOGIC SYMPTOMS: Yes  BREAST SYMPTOMS: No  SKELETAL SYMPTOMS: No  BLOOD SYMPTOMS: No  NERVOUS SYSTEM SYMPTOMS: No  MENTAL HEALTH  "SYMPTOMS: No  Bleeding or spotting between periods: Yes  Heavy or painful periods: Yes  Irregular periods: Yes  Vaginal discharge: No  Hot flashes: No  Vaginal dryness: No  Genital ulcers: No  Reduced libido: No  Painful intercourse: No  Difficulty with sexual arousal: No  Post-menopausal bleeding: No    Physical Exam:  Vitals: Ht 1.657 m (5' 5.25\")  Wt 58.6 kg (129 lb 3.2 oz)  LMP 2018 (Exact Date)  BMI 21.34 kg/m2  Constitutional: alert, oriented, pleasant, no acute distress  HEENT: NCAT, EOMI, no scleral icterus, MMM  Neck: supple, no LAD, no thyromegaly  Card: RRR, no m/r/g  Resp: good air movement b/l, lungs CTAB, no w/r/r  GI: +BS, soft, NTND, no organomegaly or masses, no rebound/guarding  MSK: no edema, normal bulk and tone  Neuro: AOx3, cranial nerves II-XII grossly intact  Skin: no rashes/lesions on visible skin  Psych: normal mentation, affect and mood    Data:  Labs and imaging reviewed in Epic.    Assessment:  52 year old  female with a h/o of irregular and heavy period here to establish care and discuss abnormal uterine bleeding.     Plan:  1. Abnormal uterine bleeding: Discussed with patient that it is important to rule out structural abnormalities including fibroids or polyps as well as a uterine malignancy as potential etiologies for her bleeding. However, given history, patient is low risk for these. Reviewed progesterone only methods as most efficacious options for controlling abnormal uterine bleeding. Discussed options including Mirena IUD, depo-provera shots or progesterone only pill, and also surgical options including endometrial ablation and hysterectomy. Plan to obtained pelvic U/S to assess for structural abnormalities and endometrial biopsy to rule out cancer. Pt interested in having Mirena IUD placed at time of biopsy but will check with insurance for coverage first. Pt also due for yearly mammogram screening, scheduled for later today.   - US Pelvic Complete with " Transvaginal; Future    2. Encounter for screening mammogram for breast cancer  - MA Screening Digital Bilateral; Future    RTC for pelvic U/S and endometrial biopsy with visit afterwards to discuss results and treatment options.    Pt seen and discussed with Dr. Hernandez.    Sarah Marin, MS3  Pager: [phone]    Physician Attestation   I, Cher Hernandez, was present with the medical student who participated in the service and in the documentation of the note.  I have verified the history and personally performed the physical exam and medical decision making.  I agree with the assessment and plan of care as documented in the note.        Cher Hernandez MD

## 2018-08-20 NOTE — LETTER
2018       RE: Carie Samson  [address] [facility] [address]     Dear Colleague,    Thank you for referring your patient, Carie Samson, to the [facility] at [facility]. Please see a copy of my visit note below.    Gynecology Clinic Note  2018    CC: Carie Samson is a 52 year old   female here to establish care and discuss heavy, irregular periods    HPI:   Patient reports she always has had heavy, irregular periods. About 15-20 years ago had an endometrioma found on U/S as part of workup for uterine bleeding, had surgery to remove it and was diagnosed at that time with endometriosis. Started on nonstop OCP shortly afterward which was effective at stopping her periods. About a year ago was having some symptoms consistent with menopause, hot flashes and night sweats, so went off OCP. Menopause symptoms resolved but period started back up a few months later.  Since that time has been having periods every 2-3 months that last up to 3 weeks at a time, heavy and irregular bleeding with painful cramps. Using 10+ tampons on heavy days. Has associated nausea, bloating, acne. Denies HA or mood symptoms. Starting taking OCP again 6 months ago but continues to have heavy, irregular periods. Would like to discuss options for reducing or stopping periods. Does not require birth control,  had a vasectomy.    Obstetric History:  , no complications during pregnancies, uncomplicated vaginal deliveries  Infertility History: None, first pregnancy after trying for a year, second immediately afterwards  Incontinence: Nothing significant, sometimes a little leakage with sneezing     Menstrual History:  Age at menarche: 14 years  LMP: Patient's last menstrual period was 2018 (exact date).   See HPI    Menopause History:  See HPI    Gynecology History:  Current contraception: OCP (estrogen/progesterone),   has a vasectomy  Previously used: Depo-Provera injections given after surgery to remove endometrioma  Complications: No complications    Sexual Activity:  Currently active: Yes  Orientation: Male  Current partners:   Past partners: 0  Dyspareunia: No  Bleeding with intercourse: No  Dysfunction: No  Current/past abuse/assults: No     STI history: none    Gyn Surgeries: Removal of endometrioma, unsure of date (15-20 years ago)    Health Maintenance:  Pap smear history:   Most recent:  PAP   Date Value Ref Range Status   03/08/2017 NIL  Final     HPV 16 DNA   Date Value Ref Range Status   03/08/2017 Negative NEG Final     HPV 18 DNA   Date Value Ref Range Status   03/08/2017 Negative NEG Final     Other HR HPV   Date Value Ref Range Status   03/08/2017 Negative NEG Final     Prior abnormal results: No    Mammogram history:  Most recent: 3/8/2017, usually does it every 18 months  Prior abnormal results: no  Regular self breast exam: not usually    Colonoscopy history:  Most recent: 04/2016  Prior abnormal results: No    Past Medical History:   Diagnosis Date     Hypertension      Thyroid disease      Past Surgical History:   Procedure Laterality Date     COLONOSCOPY N/A 4/18/2016    Procedure: COLONOSCOPY;  Surgeon: Mason Tee MD;  Location: [facility]     GYN SURGERY      Endometriosis- right ovary     Family History   Problem Relation Age of Onset     Hypertension Father      Alcohol/Drug Father      Cancer Father 69     Melanoma     Other Cancer Father      melanoma     Diabetes Paternal Grandmother      Hypertension Paternal Grandmother      Allergies Mother      GASTROINTESTINAL DISEASE Mother      Cancer Mother      lymphoma/leukemia     Other Cancer Mother      lymphoma & leukemia     Cancer Maternal Grandmother      Circulatory Paternal Grandfather      Gynecology Daughter      Other - See Comments Daughter 22     junctional rhythm--heart     GASTROINTESTINAL DISEASE Brother      Hypertension Brother       "Allergies Son      Cerebrovascular Disease No family hx of      C.A.D. No family hx of      Breast Cancer No family hx of      Cancer - colorectal No family hx of      Prostate Cancer No family hx of      Family history of uterine or ovarian cancer: no  Family history of breast cancer: no  Family history of colon cancer: no    Social History     Social History     Marital status:      Spouse name: Damion Samson     Number of children: N/A     Years of education: N/A     Occupational History      [employer]     Social History Main Topics     Smoking status: Never Smoker     Smokeless tobacco: Never Used     Alcohol use Yes      Comment: Weekends     Drug use: No     Sexual activity: Yes     Partners: Male     Birth control/ protection: Pill, Male Surgical     Other Topics Concern     Parent/Sibling W/ Cabg, Mi Or Angioplasty Before 65f 55m? No     Social History Narrative     Current Outpatient Prescriptions   Medication Sig Dispense Refill     fluticasone (FLONASE SENSIMIST) 27.5 MCG/SPRAY spray        ASPIRIN 81 MG OR TABS 1 TABLET DAILY       hydrochlorothiazide (HYDRODIURIL) 25 MG tablet Take 1 tablet (25 mg) by mouth daily 90 tablet 3     KELNOR 1/35 1-35 MG-MCG per tablet TAKE 1 TABLET DAILY CONTINUOUSLY 84 tablet 1     levothyroxine (SYNTHROID/LEVOTHROID) 50 MCG tablet Take 1 tablet (50 mcg) by mouth daily 90 tablet 3     Loratadine (CLARITIN PO) Take  by mouth daily.       methylcellulose (CITRUCEL) 500 MG TABS tablet        [DISCONTINUED] ethynodiol-ethinyl estradiol (ZOVIA 1/35E, 28,) 1-35 MG-MCG per tablet Take  by mouth daily. Continuously 120 tablet 3     Allergies   Allergen Reactions     Amoxicillin Rash      Immunization History   Administered Date(s) Administered     Influenza (IIV3) PF 11/10/2010, 02/01/2012     TDAP Vaccine (Adacel) 04/24/2013       Physical Exam:  Vitals: Ht 1.657 m (5' 5.25\")  Wt 58.6 kg (129 lb 3.2 oz)  LMP 07/04/2018 (Exact Date)  BMI 21.34 " kg/m2  Constitutional: alert, oriented, pleasant, no acute distress  HEENT: NCAT, EOMI, no scleral icterus, MMM  Neck: supple, no LAD, no thyromegaly  Card: RRR, no m/r/g  Resp: good air movement b/l, lungs CTAB, no w/r/r  GI: +BS, soft, NTND, no organomegaly or masses, no rebound/guarding  MSK: no edema, normal bulk and tone  Neuro: AOx3, cranial nerves II-XII grossly intact  Skin: no rashes/lesions on visible skin  Psych: normal mentation, affect and mood    Data:  Labs and imaging reviewed in Epic.    Assessment:  52 year old  female with a h/o of irregular and heavy period here to establish care and discuss abnormal uterine bleeding.     Plan:  1. Abnormal uterine bleeding: Discussed with patient that it is important to rule out structural abnormalities including fibroids or polyps as well as a uterine malignancy as potential etiologies for her bleeding. However, given history, patient is low risk for these. Reviewed progesterone only methods as most efficacious options for controlling abnormal uterine bleeding. Discussed options including Mirena IUD, depo-provera shots or progesterone only pill, and also surgical options including endometrial ablation and hysterectomy. Plan to obtained pelvic U/S to assess for structural abnormalities and endometrial biopsy to rule out cancer. Pt interested in having Mirena IUD placed at time of biopsy but will check with insurance for coverage first. Pt also due for yearly mammogram screening, scheduled for later today.   - US Pelvic Complete with Transvaginal; Future    2. Encounter for screening mammogram for breast cancer  - MA Screening Digital Bilateral; Future    RTC for pelvic U/S and endometrial biopsy with visit afterwards to discuss results and treatment options.    Pt seen and discussed with Dr. Hernandez.    Sarah Marin, MS3  Pager: [phone]    Physician Attestation   I, Cher Hernandez, was present with the medical student who participated in the service  and in the documentation of the note.  I have verified the history and personally performed the physical exam and medical decision making.  I agree with the assessment and plan of care as documented in the note.        Again, thank you for allowing me to participate in the care of your patient.      Sincerely,    Cher Hernandez MD

## 2018-08-20 NOTE — MR AVS SNAPSHOT
After Visit Summary   8/20/2018    Carie Samson    MRN: [id number]           Patient Information     Date Of Birth          1965        Visit Information        Provider Department      8/20/2018 1:00 PM Cher Hernandez MD [facility]        Today's Diagnoses     Abnormal uterine bleeding    -  1    Encounter for screening mammogram for breast cancer           Follow-ups after your visit        Follow-up notes from your care team     Return in about 2 weeks (around 9/3/2018) for Pelvic US and f/u with Mary.      Your next 10 appointments already scheduled     Sep 17, 2018  2:00 PM CDT   US PELVIC COMPLETE W TRANSVAGINAL with [id number]   [facility] ([facility])    [facility]  3rd Floor, [address]  [address] [place] [id number] [phone]           Please bring a list of your medicines (including vitamins, minerals and over-the-counter drugs). Also, tell your doctor about any allergies you may have. Wear comfortable clothes and leave your valuables at home.  Adults: Drink four 8-ounce glasses of fluid an hour before your exam. If you need to empty your bladder before your exam, try to release only a little urine. Then, drink another glass of fluid.  Children: Children who are potty trained up to 6 years old should drink at least 2 cups (16 oz) of water/non-carbonated beverage 30 minutes prior to the exam. Children who are 6-10 years should drink at least 3 cups (24 oz) of water/non-carbonated beverage 45 minutes prior to the exam. Children who are 10 years or older should drink at least 4 cups (32 oz) of water/non-carbonated beverage 45 minutes prior to the exam. If your child is very uncomfortable or has an urgent need to pee, please notify a technologist; they will try to find out how much longer the wait may be and provide instructions to help relieve the pressure.  Please call the Imaging  "Department at your exam site with any questions.            Sep 17, 2018  2:45 PM CDT   Return Visit with Cher Hernandez MD   [facility] ([facility])    [facility]  [address]  [address]  [facility] [id number] [phone]              Who to contact     Please call your clinic at [phone] to:    Ask questions about your health    Make or cancel appointments    Discuss your medicines    Learn about your test results    Speak to your doctor            Additional Information About Your Visit        [employer] Information     [employer] gives you secure access to your electronic health record. If you see a primary care provider, you can also send messages to your care team and make appointments. If you have questions, please call your primary care clinic.  If you do not have a primary care provider, please call [phone] and they will assist you.      [employer] is an electronic gateway that provides easy, online access to your medical records. With [employer], you can request a clinic appointment, read your test results, renew a prescription or communicate with your care team.     To access your existing account, please contact your [facility] or call [phone] for assistance.        Care EveryWhere ID     This is your Care EveryWhere ID. This could be used by other organizations to access your [facility] medical records  [id number]        Your Vitals Were     Height Last Period BMI (Body Mass Index)             1.657 m (5' 5.25\") 07/04/2018 (Exact Date) 21.34 kg/m2          Blood Pressure from Last 3 Encounters:   03/21/18 134/78   03/08/17 111/77   04/18/16 148/80    Weight from Last 3 Encounters:   08/20/18 58.6 kg (129 lb 3.2 oz)   03/21/18 59.8 kg (131 lb 12.8 oz)   03/08/17 56.7 kg (125 lb)                 Today's Medication Changes          These changes are accurate as of 8/20/18 11:59 PM.  If you have any " questions, ask your nurse or doctor.               These medicines have changed or have updated prescriptions.        Dose/Directions    KELNOR 1/35 1-35 MG-MCG per tablet   This may have changed:  Another medication with the same name was removed. Continue taking this medication, and follow the directions you see here.   Used for:  Endometriosis   Generic drug:  ethynodiol-ethinyl estradiol   Changed by:  Cher Hernandez MD        TAKE 1 TABLET DAILY CONTINUOUSLY   Quantity:  84 tablet   Refills:  1         Stop taking these medicines if you haven't already. Please contact your care team if you have questions.     CITRACAL OR   Stopped by:  Cher Hernandez MD                    Primary Care Provider Office Phone # Fax #    Nancy YOON MD [facility] [phone] [address] [facility] [address]        Equal Access to Services     [facility] Center[facility] : Siobhan hanseno Sobetzaida, waaxda luqadaha, qaybta kaalmada adeegyaalta, stanley yanez . So [facility] [phone].    ATENCIÓN: Si habla español, tiene a hoskins disposición servicios gratuitos de asistencia lingüística. LlOhio[facility] [phone].    We comply with applicable federal civil rights laws and [place] laws. We do not discriminate on the basis of race, color, national origin, age, disability, sex, sexual orientation, or gender identity.            Thank you!     Thank you for choosing [facility]  for your care. Our goal is always to provide you with excellent care. Hearing back from our patients is one way we can continue to improve our services. Please take a few minutes to complete the written survey that you may receive in the mail after your visit with us. Thank you!             Your Updated Medication List - Protect others around you: Learn how to safely use, store and throw away your medicines at www.disposemymeds.org.          This list is accurate as of 8/20/18 11:59 PM.  Always use your most  recent med list.                   Brand Name Dispense Instructions for use Diagnosis    aspirin 81 MG tablet      1 TABLET DAILY        CLARITIN PO      Take  by mouth daily.        FLONASE SENSIMIST 27.5 MCG/SPRAY spray   Generic drug:  fluticasone           hydrochlorothiazide 25 MG tablet    HYDRODIURIL    90 tablet    Take 1 tablet (25 mg) by mouth daily    Benign essential hypertension       KELNOR 1/35 1-35 MG-MCG per tablet   Generic drug:  ethynodiol-ethinyl estradiol     84 tablet    TAKE 1 TABLET DAILY CONTINUOUSLY    Endometriosis       levothyroxine 50 MCG tablet    SYNTHROID/LEVOTHROID    90 tablet    Take 1 tablet (50 mcg) by mouth daily    Hypothyroidism due to acquired atrophy of thyroid       methylcellulose 500 MG Tabs tablet    CITRUCEL

## 2018-09-17 ENCOUNTER — OFFICE VISIT (OUTPATIENT)
Dept: OBGYN | Facility: CLINIC | Age: 53
End: 2018-09-17
Attending: OBSTETRICS & GYNECOLOGY
Payer: COMMERCIAL

## 2018-09-17 ENCOUNTER — RADIANT APPOINTMENT (OUTPATIENT)
Dept: ULTRASOUND IMAGING | Facility: CLINIC | Age: 53
End: 2018-09-17
Attending: OBSTETRICS & GYNECOLOGY
Payer: COMMERCIAL

## 2018-09-17 VITALS
DIASTOLIC BLOOD PRESSURE: 81 MMHG | WEIGHT: 131.7 LBS | BODY MASS INDEX: 21.94 KG/M2 | HEIGHT: 65 IN | HEART RATE: 61 BPM | SYSTOLIC BLOOD PRESSURE: 143 MMHG

## 2018-09-17 DIAGNOSIS — N93.9 ABNORMAL UTERINE BLEEDING: ICD-10-CM

## 2018-09-17 DIAGNOSIS — D25.0 INTRAMURAL, SUBMUCOUS, AND SUBSEROUS LEIOMYOMA OF UTERUS: Primary | ICD-10-CM

## 2018-09-17 DIAGNOSIS — D25.1 INTRAMURAL, SUBMUCOUS, AND SUBSEROUS LEIOMYOMA OF UTERUS: Primary | ICD-10-CM

## 2018-09-17 DIAGNOSIS — D25.2 INTRAMURAL, SUBMUCOUS, AND SUBSEROUS LEIOMYOMA OF UTERUS: Primary | ICD-10-CM

## 2018-09-17 PROCEDURE — 76830 TRANSVAGINAL US NON-OB: CPT

## 2018-09-17 NOTE — PROGRESS NOTES
Gynecology Visit Note  9/17/18    Reason for visit: Pre-operative Exam, Discuss US results, Endometrial biopsy    HPI: Patient is a 53 yo P2 recently seen for concerns of heavy, irregular periods.  Reportedly patient had a history of endometriosis and was started on OCP continuously for a long time.  1 year ago she went off OCP as was having menopausal symptoms.  Her vasomotor symptoms improved, but 3 months later her periods returned and now having every 2-3 months lasting for 3 weeks.  Plan at that time was to get Pelvic US to assess for structural abnormalities and then consider treatment options as appropriate.  Patient presents to discuss US results and proceed with biopsy and management as appropriate.    Patient has not had bleed since last visit.  Curious about results.  Hoping we can make a plan for management moving forward.    Past Medical History:   Diagnosis Date     Hypertension      Thyroid disease      Past Surgical History:   Procedure Laterality Date     COLONOSCOPY N/A 4/18/2016    Procedure: COLONOSCOPY;  Surgeon: Mason Tee MD;  Location: [facility]     GYN SURGERY      Endometriosis- right ovary       Current Outpatient Prescriptions on File Prior to Visit:  ASPIRIN 81 MG OR TABS 1 TABLET DAILY   fluticasone (FLONASE SENSIMIST) 27.5 MCG/SPRAY spray    hydrochlorothiazide (HYDRODIURIL) 25 MG tablet Take 1 tablet (25 mg) by mouth daily   KELNOR 1/35 1-35 MG-MCG per tablet TAKE 1 TABLET DAILY CONTINUOUSLY   levothyroxine (SYNTHROID/LEVOTHROID) 50 MCG tablet Take 1 tablet (50 mcg) by mouth daily   Loratadine (CLARITIN PO) Take  by mouth daily.   methylcellulose (CITRUCEL) 500 MG TABS tablet      Allergies   Allergen Reactions     Amoxicillin Rash     Social History   Substance Use Topics     Smoking status: Never Smoker     Smokeless tobacco: Never Used     Alcohol use Yes      Comment: Weekends     Family History   Problem Relation Age of Onset     Hypertension Father      Alcohol/Drug Father       "Cancer Father 69     Melanoma     Other Cancer Father      melanoma     Diabetes Paternal Grandmother      Hypertension Paternal Grandmother      Allergies Mother      GASTROINTESTINAL DISEASE Mother      Cancer Mother      lymphoma/leukemia     Other Cancer Mother      lymphoma & leukemia     Cancer Maternal Grandmother      Circulatory Paternal Grandfather      Gynecology Daughter      Other - See Comments Daughter 22     junctional rhythm--heart     GASTROINTESTINAL DISEASE Brother      Hypertension Brother      Allergies Son      Cerebrovascular Disease No family hx of      C.A.D. No family hx of      Breast Cancer No family hx of      Cancer - colorectal No family hx of      Prostate Cancer No family hx of      ROS: A complete 10 point ROS was conducted and was negative aside from that noted in the HPI    O:  Vitals:    09/17/18 1455   BP: 143/81   Pulse: 61   Weight: 59.7 kg (131 lb 11.2 oz)   Height: 1.657 m (5' 5.25\")      General: NAD, A&Ox3  CV: RRR, no m/r/g  Resp: Lungs CTA B/L  Abdomen: Soft, NT, ND  Extremities: No edema bilaterally    Pelvic US 9/17/18:  Uterine findings:                        Presence: Visible Size: slightly enlarged 7.9x6.6x6.8 cm.  Endometrium = 2.0 mm.                        Cx length = 42.1 mm.                            Flexion:  Midposition             Position: Midline                    Margins: Abnormal                Shape: Abnormal                        Contour: Irregular                  Texture: Heterogeneous                   Cavity: Abnormal - small myoma in cavity - 10 x 10 x 4mm                Masses: Abnormal - multiple myomas, largest - 1.) anterior right - 3.5 x 4.4 x 3.8cm  2.) posterior - 3.6 x 3.8 x 3.1cm  3.) posterior - 2.1 x 2.1 x 1.7cm  4.) posterior pedunculated - 3.0 x 2.9 x 2.4cm.      Pelvic findings:                         Right Adnexa: Normal                        Left Adnexa: Normal                        Bladder: "  Normal                                                                                                             Cul - de - sac fluid: None      Ovarian follicles:                        Right ovary:  1.2x1.0x.90cm.                            0 follicles                           Left ovary:  .91x.97x1.4cm.                            0 follicles    A/P: 53 yo P2 presents to discuss management of AUB-L found on US today  1) AUB-L: Discussed with patient given her symptoms and US findings, would recommend hysteroscopy, morcellation of submucosal fibroid and Mirena IUD insertion.  We discussed risks of bleeding, infection, perforation.  We discussed possible malplacement of IUD and IUD expulsion.  All of the patient's questions were answered today.  Surgical request placed.      Cher Hernandez MD

## 2018-09-17 NOTE — LETTER
9/17/2018       RE: Carie Samson  [address] [facility] [address] [facility] [address]     Dear Colleague,    Thank you for referring your patient, Carie Samson, to the [facility] at [facility]. Please see a copy of my visit note below.    Gynecology Visit Note  9/17/18    Reason for visit: Pre-operative Exam, Discuss US results, Endometrial biopsy    HPI: Patient is a 51 yo P2 recently seen for concerns of heavy, irregular periods.  Reportedly patient had a history of endometriosis and was started on OCP continuously for a long time.  1 year ago she went off OCP as was having menopausal symptoms.  Her vasomotor symptoms improved, but 3 months later her periods returned and now having every 2-3 months lasting for 3 weeks.  Plan at that time was to get Pelvic US to assess for structural abnormalities and then consider treatment options as appropriate.  Patient presents to discuss US results and proceed with biopsy and management as appropriate.    Patient has not had bleed since last visit.  Curious about results.  Hoping we can make a plan for management moving forward.    Past Medical History:   Diagnosis Date     Hypertension      Thyroid disease      Past Surgical History:   Procedure Laterality Date     COLONOSCOPY N/A 4/18/2016    Procedure: COLONOSCOPY;  Surgeon: Mason Tee MD;  Location: [facility]     GYN SURGERY      Endometriosis- right ovary       Current Outpatient Prescriptions on File Prior to Visit:  ASPIRIN 81 MG OR TABS 1 TABLET DAILY   fluticasone (FLONASE SENSIMIST) 27.5 MCG/SPRAY spray    hydrochlorothiazide (HYDRODIURIL) 25 MG tablet Take 1 tablet (25 mg) by mouth daily   KELNOR 1/35 1-35 MG-MCG per tablet TAKE 1 TABLET DAILY CONTINUOUSLY   levothyroxine (SYNTHROID/LEVOTHROID) 50 MCG tablet Take 1 tablet (50 mcg) by mouth daily   Loratadine (CLARITIN PO) Take  by mouth daily.   methylcellulose (CITRUCEL) 500 MG TABS tablet   "    Allergies   Allergen Reactions     Amoxicillin Rash     Social History   Substance Use Topics     Smoking status: Never Smoker     Smokeless tobacco: Never Used     Alcohol use Yes      Comment: Weekends     Family History   Problem Relation Age of Onset     Hypertension Father      Alcohol/Drug Father      Cancer Father 69     Melanoma     Other Cancer Father      melanoma     Diabetes Paternal Grandmother      Hypertension Paternal Grandmother      Allergies Mother      GASTROINTESTINAL DISEASE Mother      Cancer Mother      lymphoma/leukemia     Other Cancer Mother      lymphoma & leukemia     Cancer Maternal Grandmother      Circulatory Paternal Grandfather      Gynecology Daughter      Other - See Comments Daughter 22     junctional rhythm--heart     GASTROINTESTINAL DISEASE Brother      Hypertension Brother      Allergies Son      Cerebrovascular Disease No family hx of      C.A.D. No family hx of      Breast Cancer No family hx of      Cancer - colorectal No family hx of      Prostate Cancer No family hx of      O:  Vitals:    09/17/18 1455   BP: 143/81   Pulse: 61   Weight: 59.7 kg (131 lb 11.2 oz)   Height: 1.657 m (5' 5.25\")      General: NAD, A&Ox3  CV: RRR, no m/r/g  Resp: Lungs CTA B/L  Abdomen: Soft, NT, ND  Extremities: No edema bilaterally    Pelvic US 9/17/18:  Uterine findings:                        Presence: Visible Size: slightly enlarged 7.9x6.6x6.8 cm.  Endometrium = 2.0 mm.                        Cx length = 42.1 mm.                            Flexion:  Midposition             Position: Midline                    Margins: Abnormal                Shape: Abnormal                        Contour: Irregular                  Texture: Heterogeneous                   Cavity: Abnormal - small myoma in cavity - 10 x 10 x 4mm                Masses: Abnormal - multiple myomas, largest - 1.) anterior right - 3.5 x 4.4 x 3.8cm  2.) posterior - 3.6 x 3.8 x 3.1cm  3.) posterior - 2.1 x 2.1 x 1.7cm  4.) " posterior pedunculated - 3.0 x 2.9 x 2.4cm.      Pelvic findings:                         Right Adnexa: Normal                        Left Adnexa: Normal                        Bladder:  Normal                                                                                                             Cul - de - sac fluid: None      Ovarian follicles:                        Right ovary:  1.2x1.0x.90cm.                            0 follicles                           Left ovary:  .91x.97x1.4cm.                            0 follicles    A/P: 53 yo P2 presents to discuss management of AUB-L found on US today  1) AUB-L: Discussed with patient given her symptoms and US findings, would recommend hysteroscopy, morcellation of submucosal fibroid and Mirena IUD insertion.  We discussed risks of bleeding, infection, perforation.  We discussed possible malplacement of IUD and IUD expulsion.  All of the patient's questions were answered today.  Surgical request placed.      Cher Hernandez MD

## 2018-09-17 NOTE — MR AVS SNAPSHOT
"              After Visit Summary   9/17/2018    Carie Samson    MRN: [id number]           Patient Information     Date Of Birth          1965        Visit Information        Provider Department      9/17/2018 2:45 PM Cher Hernandez MD [facility]        Today's Diagnoses     Intramural, submucous, and subserous leiomyoma of uterus    -  1       Follow-ups after your visit        Who to contact     Please call your clinic at [phone] to:    Ask questions about your health    Make or cancel appointments    Discuss your medicines    Learn about your test results    Speak to your doctor            Additional Information About Your Visit        MyChart Information     [employer] gives you secure access to your electronic health record. If you see a primary care provider, you can also send messages to your care team and make appointments. If you have questions, please call your primary care clinic.  If you do not have a primary care provider, please call [phone] and they will assist you.      [employer] is an electronic gateway that provides easy, online access to your medical records. With [employer], you can request a clinic appointment, read your test results, renew a prescription or communicate with your care team.     To access your existing account, please contact your [facility] or call [phone] for assistance.        Care EveryWhere ID     This is your Care EveryWhere ID. This could be used by other organizations to access your [place] medical records  [id number]        Your Vitals Were     Pulse Height BMI (Body Mass Index)             61 1.657 m (5' 5.25\") 21.75 kg/m2          Blood Pressure from Last 3 Encounters:   09/17/18 143/81   03/21/18 134/78   03/08/17 111/77    Weight from Last 3 Encounters:   09/17/18 59.7 kg (131 lb 11.2 oz)   08/20/18 58.6 kg (129 lb 3.2 oz)   03/21/18 59.8 kg (131 lb 12.8 oz)              We Performed the " Following     Johana-Operative Worksheet (WHS)        Primary Care Provider Office Phone # Fax #    Nancy Sanz [phone] [id number] [address]        Equal Access to Services     YURINEL JAMA : Hadjana jeff ku jadeo Sojadonali, waaxda luqadaha, qaybta kaalmada adejordanada, stanley perezpriscilla abbey. So [facility] [phone].    ATENCIÓN: Si habla español, tiene a hoskins disposición servicios gratuitos de asistencia lingüística. Llame al [phone].    We comply with applicable federal civil rights laws and [place] laws. We do not discriminate on the basis of race, color, national origin, age, disability, sex, sexual orientation, or gender identity.            Thank you!     Thank you for choosing [facility]  for your care. Our goal is always to provide you with excellent care. Hearing back from our patients is one way we can continue to improve our services. Please take a few minutes to complete the written survey that you may receive in the mail after your visit with us. Thank you!             Your Updated Medication List - Protect others around you: Learn how to safely use, store and throw away your medicines at www.disposemymeds.org.          This list is accurate as of 9/17/18 11:59 PM.  Always use your most recent med list.                   Brand Name Dispense Instructions for use Diagnosis    aspirin 81 MG tablet      1 TABLET DAILY        CLARITIN PO      Take  by mouth daily.        FLONASE SENSIMIST 27.5 MCG/SPRAY spray   Generic drug:  fluticasone           hydrochlorothiazide 25 MG tablet    HYDRODIURIL    90 tablet    Take 1 tablet (25 mg) by mouth daily    Benign essential hypertension       KELNOR 1/35 1-35 MG-MCG per tablet   Generic drug:  ethynodiol-ethinyl estradiol     84 tablet    TAKE 1 TABLET DAILY CONTINUOUSLY    Endometriosis       levothyroxine 50 MCG tablet    SYNTHROID/LEVOTHROID    90 tablet    Take 1 tablet (50  mcg) by mouth daily    Hypothyroidism due to acquired atrophy of thyroid       methylcellulose 500 MG Tabs tablet    CITRUCEL

## 2018-09-20 ENCOUNTER — TELEPHONE (OUTPATIENT)
Dept: OBGYN | Facility: CLINIC | Age: 53
End: 2018-09-20

## 2018-09-20 NOTE — TELEPHONE ENCOUNTER
Confirmed surgery date with patient 11/13/18 with arrival time at 6:30a.m with nothing to eat eight hours before scheduled surgery time and clear liquids up to two hours before scheduled surgery time, informed patient that a pre op physical is needed within thirty days of surgery and that a surgery map and letter will be mailed out.     to complete the following fields:            CHECKLIST     Google Calendar : Yes     Resident notified: Not Applicable     Clinic schedule blocked:  Not Applicable    Patient notified:Yes      Pre op information sent: Yes     Given to patient over the phone.Yes    Comments:

## 2018-10-24 ENCOUNTER — OFFICE VISIT (OUTPATIENT)
Dept: FAMILY MEDICINE | Facility: CLINIC | Age: 53
End: 2018-10-24
Payer: COMMERCIAL

## 2018-10-24 VITALS
OXYGEN SATURATION: 97 % | TEMPERATURE: 98 F | SYSTOLIC BLOOD PRESSURE: 126 MMHG | BODY MASS INDEX: 22.33 KG/M2 | WEIGHT: 134 LBS | DIASTOLIC BLOOD PRESSURE: 70 MMHG | HEIGHT: 65 IN | HEART RATE: 68 BPM

## 2018-10-24 DIAGNOSIS — N92.1 MENORRHAGIA WITH IRREGULAR CYCLE: ICD-10-CM

## 2018-10-24 DIAGNOSIS — Z01.818 PREOP GENERAL PHYSICAL EXAM: Primary | ICD-10-CM

## 2018-10-24 LAB — POTASSIUM SERPL-SCNC: 3.2 MMOL/L (ref 3.4–5.3)

## 2018-10-24 PROCEDURE — 99214 OFFICE O/P EST MOD 30 MIN: CPT | Performed by: FAMILY MEDICINE

## 2018-10-24 PROCEDURE — 93000 ELECTROCARDIOGRAM COMPLETE: CPT | Performed by: FAMILY MEDICINE

## 2018-10-24 PROCEDURE — 36415 COLL VENOUS BLD VENIPUNCTURE: CPT | Performed by: FAMILY MEDICINE

## 2018-10-24 PROCEDURE — 84132 ASSAY OF SERUM POTASSIUM: CPT | Performed by: FAMILY MEDICINE

## 2018-10-24 NOTE — PATIENT INSTRUCTIONS
Before Your Surgery      Call your surgeon if there is any change in your health. This includes signs of a cold or flu (such as a sore throat, runny nose, cough, rash or fever).    Do not smoke, drink alcohol or take over the counter medicine (unless your surgeon or primary care doctor tells you to) for the 24 hours before and after surgery.    If you take prescribed drugs: Follow your doctor s orders about which medicines to take and which to stop until after surgery.    Eating and drinking prior to surgery: follow the instructions from your surgeon    Take a shower or bath the night before surgery. Use the soap your surgeon gave you to gently clean your skin. If you do not have soap from your surgeon, use your regular soap. Do not shave or scrub the surgery site.  Wear clean pajamas and have clean sheets on your bed.     Stop the aspirin 10 days before surgery

## 2018-10-24 NOTE — PROGRESS NOTES
[facility] (formerly [facility])  [address] [address]  [phone]  Dept: [phone]    PRE-OP EVALUATION:  Today's date: 10/24/2018    Carie Samson (: 1965) presents for pre-operative evaluation assessment as requested by Dr. Cher Hernandez MD.  She requires evaluation and anesthesia risk assessment prior to undergoing surgery/procedure for treatment of Fibroid cyst  .    Proposed Surgery/ Procedure: Hysteroscopy, Morcellation of Submucosal Fibroid  Date of Surgery/ Procedure: 2018  Time of Surgery/ Procedure: 8:30 AM  Hospital/Surgical Facility: [facility]  Primary Physician: Nancy Sanz  Type of Anesthesia Anticipated: Local with MAC    Patient has a Health Care Directive or Living Will:  YES    1. NO - Do you have a history of heart attack, stroke, stent, bypass or surgery on an artery in the head, neck, heart or legs?  2. NO - Do you ever have any pain or discomfort in your chest?  3. NO - Do you have a history of  Heart Failure?  4. NO - Are you troubled by shortness of breath when: walking on the level, up a slight hill or at night?  5. NO - Do you currently have a cold, bronchitis or other respiratory infection?  6. NO - Do you have a cough, shortness of breath or wheezing?  7. NO - Do you sometimes get pains in the calves of your legs when you walk?  8. NO - Do you or anyone in your family have previous history of blood clots?  9. NO - Do you or does anyone in your family have a serious bleeding problem such as prolonged bleeding following surgeries or cuts?  10. NO - Have you ever had problems with anemia or been told to take iron pills?  11. YES - HAVE YOU HAD ANY ABNORMAL BLOOD LOSS SUCH AS BLACK, TARRY OR BLOODY STOOLS, OR ABNORMAL VAGINAL BLEEDING? Yes this is why she is having the procedure   11. NO - Have you had any abnormal blood loss such as black, tarry or bloody stools, or abnormal vaginal bleeding?  12. NO - Have you ever had a  blood transfusion?  13. NO - Have you or any of your relatives ever had problems with anesthesia?  14. NO - Do you have sleep apnea, excessive snoring or daytime drowsiness?  15. NO - Do you have any prosthetic heart valves?  16. NO - Do you have prosthetic joints?  17. NO - Is there any chance that you may be pregnant?      HPI:     HPI related to upcoming procedure: intermittent spotting       See problem list for active medical problems.  Problems all longstanding and stable, except as noted/documented.  See ROS for pertinent symptoms related to these conditions.                                                                                                                                                          .    MEDICAL HISTORY:     Patient Active Problem List    Diagnosis Date Noted     Essential hypertension 03/08/2017     Priority: Medium     Acquired hypothyroidism 03/08/2017     Priority: Medium     Insomnia 05/06/2015     Priority: Medium     Problem list name updated by automated process. Provider to review       Endometriosis 01/29/2011     Priority: Medium     Well-controlled with daily ocp       CARDIOVASCULAR SCREENING; LDL GOAL LESS THAN 160 10/31/2010     Priority: Medium     HTN (hypertension) 01/13/2010     Priority: Medium     January 13, 2010 -- cough from lisinopril  We'll switch you to chlorthalidone 25 mg daily.  Return in 2-3 weeks for a nurse only visit to recheck your BP and electrolytes.   We'll recheck your thyroid levels at the same time.    If BP< 130/80 recheck in 6 months.    If your BP is still > 130/80, then I would recommend increasing the chlorthalidone to 50 mg daily and recheck with me 2-3 weeks thereafter.          Past Medical History:   Diagnosis Date     Hypertension      Thyroid disease      Past Surgical History:   Procedure Laterality Date     COLONOSCOPY N/A 4/18/2016    Procedure: COLONOSCOPY;  Surgeon: Mason Tee MD;  Location: [facility]     GYN SURGERY       "Endometriosis- right ovary     Current Outpatient Prescriptions   Medication Sig Dispense Refill     ASPIRIN 81 MG OR TABS 1 TABLET DAILY       fluticasone (FLONASE SENSIMIST) 27.5 MCG/SPRAY spray        hydrochlorothiazide (HYDRODIURIL) 25 MG tablet Take 1 tablet (25 mg) by mouth daily 90 tablet 3     KELNOR 1/35 1-35 MG-MCG per tablet TAKE 1 TABLET DAILY CONTINUOUSLY 84 tablet 1     levothyroxine (SYNTHROID/LEVOTHROID) 50 MCG tablet Take 1 tablet (50 mcg) by mouth daily 90 tablet 3     Loratadine (CLARITIN PO) Take  by mouth daily.       methylcellulose (CITRUCEL) 500 MG TABS tablet        OTC products: None, except as noted above    Allergies   Allergen Reactions     Amoxicillin Rash      Latex Allergy: NO    Social History   Substance Use Topics     Smoking status: Never Smoker     Smokeless tobacco: Never Used     Alcohol use Yes      Comment: Weekends     History   Drug Use No       REVIEW OF SYSTEMS:   Constitutional, neuro, ENT, endocrine, pulmonary, cardiac, gastrointestinal, genitourinary, musculoskeletal, integument and psychiatric systems are negative, except as otherwise noted.    EXAM:   /70  Pulse 68  Temp 98  F (36.7  C) (Tympanic)  Ht 5' 5.25\" (1.657 m)  Wt 134 lb (60.8 kg)  SpO2 97%  BMI 22.13 kg/m2    GENERAL APPEARANCE: healthy, alert and no distress     HENT: ear canals and TM's normal and nose and mouth without ulcers or lesions     NECK: no adenopathy, no asymmetry, masses, or scars and thyroid normal to palpation     RESP: lungs clear to auscultation - no rales, rhonchi or wheezes     CV: regular rates and rhythm, normal S1 S2, no S3 or S4 and no murmur, click or rub     ABDOMEN:  soft, nontender, no HSM or masses and bowel sounds normal     MS: extremities normal- no gross deformities noted, no evidence of inflammation in joints, FROM in all extremities.     SKIN: no suspicious lesions or rashes     NEURO: Normal strength and tone, sensory exam grossly normal, mentation intact " and speech normal     PSYCH: mentation appears normal. and affect normal/bright    DIAGNOSTICS:   EKG: appears normal, NSR, normal axis, normal intervals, no acute ST/T changes c/w ischemia, no LVH by voltage criteria, no previous tracings    Recent Labs   Lab Test  03/21/18   0943  03/08/17   0926   NA  135  134   POTASSIUM  3.4  3.1*   CR  0.86  0.86        IMPRESSION:   Reason for surgery/procedure: irregular bleeding     The proposed surgical procedure is considered LOW risk.    REVISED CARDIAC RISK INDEX  The patient has the following serious cardiovascular risks for perioperative complications such as (MI, PE, VFib and 3  AV Block):  No serious cardiac risks  INTERPRETATION: 0 risks: Class I (very low risk - 0.4% complication rate)    The patient has the following additional risks for perioperative complications:  No identified additional risks      ICD-10-CM    1. Preop general physical exam Z01.818 EKG 12-lead complete w/read - Clinics   2. Menorrhagia with irregular cycle N92.1 Potassium       RECOMMENDATIONS:         --Patient is to take all scheduled medications on the day of surgery EXCEPT for modifications listed below.    Anticoagulant or Antiplatelet Medication Use  ASPIRIN: Discontinue ASA 7-10 days prior to procedure to reduce bleeding risk.  It should be resumed post-operatively.        APPROVAL GIVEN to proceed with proposed procedure, without further diagnostic evaluation       Signed Electronically by: Nancy Sanz MD    Copy of this evaluation report is provided to requesting physician.    [facility] Preop Guidelines    Revised Cardiac Risk Index

## 2018-10-24 NOTE — MR AVS SNAPSHOT
After Visit Summary   10/24/2018    Carie Samson    MRN: [id number]           Patient Information     Date Of Birth          1965        Visit Information        Provider Department      10/24/2018 1:00 PM Nancy Sanz MD [facility]        Today's Diagnoses     Preop general physical exam    -  1    Menorrhagia with irregular cycle          Care Instructions      Before Your Surgery      Call your surgeon if there is any change in your health. This includes signs of a cold or flu (such as a sore throat, runny nose, cough, rash or fever).    Do not smoke, drink alcohol or take over the counter medicine (unless your surgeon or primary care doctor tells you to) for the 24 hours before and after surgery.    If you take prescribed drugs: Follow your doctor s orders about which medicines to take and which to stop until after surgery.    Eating and drinking prior to surgery: follow the instructions from your surgeon    Take a shower or bath the night before surgery. Use the soap your surgeon gave you to gently clean your skin. If you do not have soap from your surgeon, use your regular soap. Do not shave or scrub the surgery site.  Wear clean pajamas and have clean sheets on your bed.     Stop the aspirin 10 days before surgery             Follow-ups after your visit        Your next 10 appointments already scheduled     Nov 13, 2018   Procedure with Cher Hernandez MD   [facility], Same Day Surgery (--)    [address] [facility] [facility] [address]   [phone]            Nov 28, 2018 10:15 AM CST   Return Visit with Cher Hernandez MD   [facility] ([facility])    [address] [facility] [address]  [address] [address]   [phone]            Dec 17, 2018  1:15 PM CST   Return Visit with Cher Hernandez MD   [facility] ([facility])    [facility] [address] [address]  [address]  [address]  [address] [facility] [phone]              Who to contact     Normal or non-critical lab and imaging results will be communicated to you by [employer], letter or phone within 4 business days after the clinic has received the results. If you do not hear from us within 7 days, please contact the clinic through [employer] or phone. If you have a critical or abnormal lab result, we will notify you by phone as soon as possible.  Submit refill requests through [employer] or call your pharmacy and they will forward the refill request to us. Please allow 3 business days for your refill to be completed.          If you need to speak with a  for additional information , please call: [phone]             Additional Information About Your Visit        [employer] Information     [employer] gives you secure access to your electronic health record. If you see a primary care provider, you can also send messages to your care team and make appointments. If you have questions, please call your primary care clinic.  If you do not have a primary care provider, please call [phone] and they will assist you.        Care EveryWhere ID     This is your Care EveryWhere ID. This could be used by other organizations to access your [place] medical records  [id number]        Your Vitals Were     Pulse Temperature Height Pulse Oximetry BMI (Body Mass Index)       68 98  F (36.7  C) (Tympanic) 5' 5.25\" (1.657 m) 97% 22.13 kg/m2        Blood Pressure from Last 3 Encounters:   10/24/18 126/70   09/17/18 143/81   03/21/18 134/78    Weight from Last 3 Encounters:   10/24/18 134 lb (60.8 kg)   09/17/18 131 lb 11.2 oz (59.7 kg)   08/20/18 129 lb 3.2 oz (58.6 kg)              We Performed the Following     EKG 12-lead complete w/read - Clinics     Potassium        Primary Care Provider Office Phone # Fax #    Nancy Sanz [phone] [id number]  [facility] [address]      "   Equal Access to Services     [facility] : Hadii randee benites jadejonelle Geraali, wabiggda luqadaha, qaybta kaleanastanley joyce. So [facility] [phone].    ATENCIÓN: Si habla español, tiene a hoskins disposición servicios gratuitos de asistencia lingüística. Gloriaame al [phone].    We comply with applicable federal civil rights laws and [place] laws. We do not discriminate on the basis of race, color, national origin, age, disability, sex, sexual orientation, or gender identity.            Thank you!     Thank you for choosing [facility]  for your care. Our goal is always to provide you with excellent care. Hearing back from our patients is one way we can continue to improve our services. Please take a few minutes to complete the written survey that you may receive in the mail after your visit with us. Thank you!             Your Updated Medication List - Protect others around you: Learn how to safely use, store and throw away your medicines at www.disposemymeds.org.          This list is accurate as of 10/24/18  1:41 PM.  Always use your most recent med list.                   Brand Name Dispense Instructions for use Diagnosis    aspirin 81 MG tablet      1 TABLET DAILY        CLARITIN PO      Take  by mouth daily.        FLONASE SENSIMIST 27.5 MCG/SPRAY spray   Generic drug:  fluticasone           hydrochlorothiazide 25 MG tablet    HYDRODIURIL    90 tablet    Take 1 tablet (25 mg) by mouth daily    Benign essential hypertension       KELNOR 1/35 1-35 MG-MCG per tablet   Generic drug:  ethynodiol-ethinyl estradiol     84 tablet    TAKE 1 TABLET DAILY CONTINUOUSLY    Endometriosis       levothyroxine 50 MCG tablet    SYNTHROID/LEVOTHROID    90 tablet    Take 1 tablet (50 mcg) by mouth daily    Hypothyroidism due to acquired atrophy of thyroid       methylcellulose 500 MG Tabs tablet    CITRUCEL

## 2018-11-13 ENCOUNTER — HOSPITAL ENCOUNTER (OUTPATIENT)
Facility: CLINIC | Age: 53
Discharge: HOME | End: 2018-11-13
Attending: OBSTETRICS & GYNECOLOGY | Admitting: OBSTETRICS & GYNECOLOGY
Payer: COMMERCIAL

## 2018-11-13 ENCOUNTER — ANESTHESIA (OUTPATIENT)
Dept: SURGERY | Facility: CLINIC | Age: 53
End: 2018-11-13
Payer: COMMERCIAL

## 2018-11-13 ENCOUNTER — SURGERY (OUTPATIENT)
Age: 53
End: 2018-11-13

## 2018-11-13 ENCOUNTER — ANESTHESIA EVENT (OUTPATIENT)
Dept: SURGERY | Facility: CLINIC | Age: 53
End: 2018-11-13
Payer: COMMERCIAL

## 2018-11-13 VITALS
HEIGHT: 65 IN | BODY MASS INDEX: 22.26 KG/M2 | RESPIRATION RATE: 14 BRPM | DIASTOLIC BLOOD PRESSURE: 73 MMHG | TEMPERATURE: 97.7 F | OXYGEN SATURATION: 100 % | HEART RATE: 84 BPM | WEIGHT: 133.6 LBS | SYSTOLIC BLOOD PRESSURE: 134 MMHG

## 2018-11-13 LAB
ABO + RH BLD: NORMAL
ABO + RH BLD: NORMAL
BLD GP AB SCN SERPL QL: NORMAL
BLOOD BANK CMNT PATIENT-IMP: NORMAL
ERYTHROCYTE [DISTWIDTH] IN BLOOD BY AUTOMATED COUNT: 12.2 % (ref 10–15)
GLUCOSE SERPL-MCNC: 93 MG/DL (ref 70–99)
HCG UR QL: NEGATIVE
HCT VFR BLD AUTO: 37.2 % (ref 35–47)
HGB BLD-MCNC: 13 G/DL (ref 11.7–15.7)
MCH RBC QN AUTO: 31.4 PG (ref 26.5–33)
MCHC RBC AUTO-ENTMCNC: 34.9 G/DL (ref 31.5–36.5)
MCV RBC AUTO: 90 FL (ref 78–100)
PLATELET # BLD AUTO: 228 10E9/L (ref 150–450)
POTASSIUM SERPL-SCNC: 3.6 MMOL/L (ref 3.4–5.3)
RBC # BLD AUTO: 4.14 10E12/L (ref 3.8–5.2)
SPECIMEN EXP DATE BLD: NORMAL
WBC # BLD AUTO: 5.8 10E9/L (ref 4–11)

## 2018-11-13 PROCEDURE — 85027 COMPLETE CBC AUTOMATED: CPT | Performed by: OBSTETRICS & GYNECOLOGY

## 2018-11-13 PROCEDURE — 36415 COLL VENOUS BLD VENIPUNCTURE: CPT | Performed by: OBSTETRICS & GYNECOLOGY

## 2018-11-13 PROCEDURE — 27210794 ZZH OR GENERAL SUPPLY STERILE: Performed by: OBSTETRICS & GYNECOLOGY

## 2018-11-13 PROCEDURE — 36000057 ZZH SURGERY LEVEL 3 1ST 30 MIN - UMMC: Performed by: OBSTETRICS & GYNECOLOGY

## 2018-11-13 PROCEDURE — 25000125 ZZHC RX 250: Performed by: OBSTETRICS & GYNECOLOGY

## 2018-11-13 PROCEDURE — 88305 TISSUE EXAM BY PATHOLOGIST: CPT | Mod: 26 | Performed by: OBSTETRICS & GYNECOLOGY

## 2018-11-13 PROCEDURE — 86901 BLOOD TYPING SEROLOGIC RH(D): CPT | Performed by: OBSTETRICS & GYNECOLOGY

## 2018-11-13 PROCEDURE — 25000132 ZZH RX MED GY IP 250 OP 250 PS 637: Performed by: STUDENT IN AN ORGANIZED HEALTH CARE EDUCATION/TRAINING PROGRAM

## 2018-11-13 PROCEDURE — 86900 BLOOD TYPING SEROLOGIC ABO: CPT | Performed by: OBSTETRICS & GYNECOLOGY

## 2018-11-13 PROCEDURE — 37000009 ZZH ANESTHESIA TECHNICAL FEE, EACH ADDTL 15 MIN: Performed by: OBSTETRICS & GYNECOLOGY

## 2018-11-13 PROCEDURE — 71000027 ZZH RECOVERY PHASE 2 EACH 15 MINS: Performed by: OBSTETRICS & GYNECOLOGY

## 2018-11-13 PROCEDURE — 82947 ASSAY GLUCOSE BLOOD QUANT: CPT | Performed by: OBSTETRICS & GYNECOLOGY

## 2018-11-13 PROCEDURE — 84132 ASSAY OF SERUM POTASSIUM: CPT | Performed by: ANESTHESIOLOGY

## 2018-11-13 PROCEDURE — 25000128 H RX IP 250 OP 636: Performed by: ANESTHESIOLOGY

## 2018-11-13 PROCEDURE — 37000008 ZZH ANESTHESIA TECHNICAL FEE, 1ST 30 MIN: Performed by: OBSTETRICS & GYNECOLOGY

## 2018-11-13 PROCEDURE — 40000170 ZZH STATISTIC PRE-PROCEDURE ASSESSMENT II: Performed by: OBSTETRICS & GYNECOLOGY

## 2018-11-13 PROCEDURE — 81025 URINE PREGNANCY TEST: CPT | Performed by: ANESTHESIOLOGY

## 2018-11-13 PROCEDURE — 88305 TISSUE EXAM BY PATHOLOGIST: CPT | Performed by: OBSTETRICS & GYNECOLOGY

## 2018-11-13 PROCEDURE — 36000059 ZZH SURGERY LEVEL 3 EA 15 ADDTL MIN UMMC: Performed by: OBSTETRICS & GYNECOLOGY

## 2018-11-13 PROCEDURE — 86850 RBC ANTIBODY SCREEN: CPT | Performed by: OBSTETRICS & GYNECOLOGY

## 2018-11-13 PROCEDURE — 25000128 H RX IP 250 OP 636: Performed by: NURSE ANESTHETIST, CERTIFIED REGISTERED

## 2018-11-13 DEVICE — IUD CONTRACEPTIVE DEVICE MIRENA 50419-4230-01: Type: IMPLANTABLE DEVICE | Site: UTERUS | Status: FUNCTIONAL

## 2018-11-13 RX ORDER — ONDANSETRON 4 MG/1
4 TABLET, ORALLY DISINTEGRATING ORAL
Status: DISCONTINUED | OUTPATIENT
Start: 2018-11-13 | End: 2018-11-13 | Stop reason: HOSPADM

## 2018-11-13 RX ORDER — FENTANYL CITRATE 50 UG/ML
INJECTION, SOLUTION INTRAMUSCULAR; INTRAVENOUS PRN
Status: DISCONTINUED | OUTPATIENT
Start: 2018-11-13 | End: 2018-11-13

## 2018-11-13 RX ORDER — ONDANSETRON 2 MG/ML
4 INJECTION INTRAMUSCULAR; INTRAVENOUS EVERY 30 MIN PRN
Status: DISCONTINUED | OUTPATIENT
Start: 2018-11-13 | End: 2018-11-13 | Stop reason: HOSPADM

## 2018-11-13 RX ORDER — MEPERIDINE HYDROCHLORIDE 25 MG/ML
12.5 INJECTION INTRAMUSCULAR; INTRAVENOUS; SUBCUTANEOUS
Status: DISCONTINUED | OUTPATIENT
Start: 2018-11-13 | End: 2018-11-13 | Stop reason: HOSPADM

## 2018-11-13 RX ORDER — ONDANSETRON 4 MG/1
4 TABLET, ORALLY DISINTEGRATING ORAL EVERY 30 MIN PRN
Status: DISCONTINUED | OUTPATIENT
Start: 2018-11-13 | End: 2018-11-13 | Stop reason: HOSPADM

## 2018-11-13 RX ORDER — LIDOCAINE HYDROCHLORIDE 10 MG/ML
INJECTION, SOLUTION INFILTRATION; PERINEURAL PRN
Status: DISCONTINUED | OUTPATIENT
Start: 2018-11-13 | End: 2018-11-13 | Stop reason: HOSPADM

## 2018-11-13 RX ORDER — NALOXONE HYDROCHLORIDE 0.4 MG/ML
.1-.4 INJECTION, SOLUTION INTRAMUSCULAR; INTRAVENOUS; SUBCUTANEOUS
Status: DISCONTINUED | OUTPATIENT
Start: 2018-11-13 | End: 2018-11-13 | Stop reason: HOSPADM

## 2018-11-13 RX ORDER — PROPOFOL 10 MG/ML
INJECTION, EMULSION INTRAVENOUS PRN
Status: DISCONTINUED | OUTPATIENT
Start: 2018-11-13 | End: 2018-11-13

## 2018-11-13 RX ORDER — SODIUM CHLORIDE, SODIUM LACTATE, POTASSIUM CHLORIDE, CALCIUM CHLORIDE 600; 310; 30; 20 MG/100ML; MG/100ML; MG/100ML; MG/100ML
INJECTION, SOLUTION INTRAVENOUS CONTINUOUS
Status: DISCONTINUED | OUTPATIENT
Start: 2018-11-13 | End: 2018-11-13 | Stop reason: HOSPADM

## 2018-11-13 RX ORDER — PROPOFOL 10 MG/ML
INJECTION, EMULSION INTRAVENOUS CONTINUOUS PRN
Status: DISCONTINUED | OUTPATIENT
Start: 2018-11-13 | End: 2018-11-13

## 2018-11-13 RX ORDER — FENTANYL CITRATE 50 UG/ML
25-50 INJECTION, SOLUTION INTRAMUSCULAR; INTRAVENOUS
Status: DISCONTINUED | OUTPATIENT
Start: 2018-11-13 | End: 2018-11-13 | Stop reason: HOSPADM

## 2018-11-13 RX ORDER — ACETAMINOPHEN 325 MG/1
975 TABLET ORAL ONCE
Status: DISCONTINUED | OUTPATIENT
Start: 2018-11-13 | End: 2018-11-13 | Stop reason: HOSPADM

## 2018-11-13 RX ORDER — KETOROLAC TROMETHAMINE 30 MG/ML
INJECTION, SOLUTION INTRAMUSCULAR; INTRAVENOUS PRN
Status: DISCONTINUED | OUTPATIENT
Start: 2018-11-13 | End: 2018-11-13

## 2018-11-13 RX ORDER — ACETAMINOPHEN 325 MG/1
650 TABLET ORAL
Status: DISCONTINUED | OUTPATIENT
Start: 2018-11-13 | End: 2018-11-13 | Stop reason: HOSPADM

## 2018-11-13 RX ADMIN — MIDAZOLAM 2 MG: 1 INJECTION INTRAMUSCULAR; INTRAVENOUS at 08:18

## 2018-11-13 RX ADMIN — SODIUM CHLORIDE, POTASSIUM CHLORIDE, SODIUM LACTATE AND CALCIUM CHLORIDE: 600; 310; 30; 20 INJECTION, SOLUTION INTRAVENOUS at 08:06

## 2018-11-13 RX ADMIN — FENTANYL CITRATE 50 MCG: 50 INJECTION, SOLUTION INTRAMUSCULAR; INTRAVENOUS at 08:18

## 2018-11-13 RX ADMIN — MIDAZOLAM 2 MG: 1 INJECTION INTRAMUSCULAR; INTRAVENOUS at 08:06

## 2018-11-13 RX ADMIN — PROPOFOL 20 MG: 10 INJECTION, EMULSION INTRAVENOUS at 08:22

## 2018-11-13 RX ADMIN — KETOROLAC TROMETHAMINE 30 MG: 30 INJECTION, SOLUTION INTRAMUSCULAR at 09:05

## 2018-11-13 RX ADMIN — LEVONORGESTREL 1 EACH: 52 INTRAUTERINE DEVICE INTRAUTERINE at 08:55

## 2018-11-13 RX ADMIN — FENTANYL CITRATE 25 MCG: 50 INJECTION, SOLUTION INTRAMUSCULAR; INTRAVENOUS at 08:26

## 2018-11-13 RX ADMIN — PROPOFOL 20 MG: 10 INJECTION, EMULSION INTRAVENOUS at 08:18

## 2018-11-13 RX ADMIN — FENTANYL CITRATE 25 MCG: 50 INJECTION, SOLUTION INTRAMUSCULAR; INTRAVENOUS at 09:05

## 2018-11-13 RX ADMIN — PROPOFOL 100 MCG/KG/MIN: 10 INJECTION, EMULSION INTRAVENOUS at 08:18

## 2018-11-13 RX ADMIN — ACETAMINOPHEN 650 MG: 325 TABLET, FILM COATED ORAL at 09:40

## 2018-11-13 RX ADMIN — SILVER NITRATE APPLICATORS 1 APPLICATOR: 25; 75 STICK TOPICAL at 09:03

## 2018-11-13 RX ADMIN — LIDOCAINE HYDROCHLORIDE 20 ML: 10 INJECTION, SOLUTION INFILTRATION; PERINEURAL at 08:35

## 2018-11-13 NOTE — OP NOTE
[facility]  Operative Note    Patient: Carie Samson  : 1965  MRN: [id number]    Date of Service: 2018    Pre-operative diagnosis:  1. Menorrhagia  2. Submucosal fibroid    Post-operative diagnosis:  Same, s/p procedure as below    Procedure:   Operative hysteroscopy, Myosure morcellation of fibroid, dilation and curettage, and Mirena IUD insertion    Surgeon: Cher Hernandez MD  Assistants: Abby Garcia MD, PGY1    Anesthesia: Local with MAC    EBL: 10 mL  Urine: Voided pre-procedure  Fluids: 500 mL cystalloid  Fluid Deficit: 600 mL    Specimens: Uterine fibroid fragments, endometrial curettings  Complications: none    Findings: EUA revealed normal cervix and anteroverted uterus, no adnexal masses.  Uterine cavity examination revealed proliferative-appearing endometrium. Normal bilateral tubal ostia visualized. Intracavitary submucosal fibroid on anterior uterine cavity wall about 1-1.5 cm. Mirena IUD with Lot# [id number], expiration date 2021.     Indications: Carie Samson is a 53 year old female who had long history of menorrhagia that was refractory to oral contraceptives and was found to have fibroid uterus including intracavitary submucosal fibroid on ultrasound. Risks, benefits, and alternatives to the procedure were discussed. The patient's questions were answered, understanding confirmed, and the patient signed written informed consent.    Technique: The patient was taken to the operating room where she was placed in the dorsal lithotomy position with feet in Rafa stirrups. The patient was placed under IV sedation anesthesia. An exam under anesthesia was perfromed with the above findings. The patient was prepped and draped in the usual sterile fashion. A speculum was placed in the vagina and the cervix visualized. After infiltration with 1 mL plain 1% lidocaine, a single-toothed tenaculum was placed on the anterior lip of the cervix at 12 o'clock. A paracervical block was  administered with 1% lidocaine 10cc bilaterally. The cervical os was carefully dilated to 6 mm with sequential half-size Hegar dilators. The operating hysteroscope introducer was placed through the cervix into the uterine cavity. The hysteroscope was attached. Examination of the uterine cavity demonstrated above findings. The remainder of the cavity was unremarkable. The reciprocating hysteroscopic morcellator was used to remove the anterior wall submucosal fibroid with good success. The hysteroscope apparatus was removed and total of 600 mL fluid deficit of normal saline noted. A sharp curette was used to scrape the uterus until gritty on all walls with return of tissue. The curetings were sent to pathology. A Mirena IUD was then opened, with lot# [id number] and expiration date April 2021. The uterus was sounded to 9 cm, and Mirena was inserted in typical fashion without difficulty. Strings were trimmed to about 3 cm. The tenaculum was removed from the cervix and after silver nitrate application, good hemostasis was noted. The speculum was removed from the vagina.    Instrument counts were correct x2. Dr. Hernandez was present and scrubbed for the entire procedure. The patient was awoken in the OR and transferred to the PACU in stable condition.    Abby Garcia MD  OB/GYN PGY-1  11/13/18 9:14 AM     Staff MD Note  I was present and scrubbed for the entire procedure noted above.  I agree with the description above and any necessary changes have been made by me.  Cher Hernandez MD

## 2018-11-13 NOTE — IP AVS SNAPSHOT
[facility] PREOP/PHASE II    [address] [place] [address]    Phone:  [phone]                                       After Visit Summary   11/13/2018    Carie Samson    MRN: [id number]           After Visit Summary Signature Page     I have received my discharge instructions, and my questions have been answered. I have discussed any challenges I see with this plan with the nurse or doctor.    ..........................................................................................................................................  Patient/Patient Representative Signature      ..........................................................................................................................................  Patient Representative Print Name and Relationship to Patient    ..................................................               ................................................  Date                                   Time    ..........................................................................................................................................  Reviewed by Signature/Title    ...................................................              ..............................................  Date                                               Time          22EPIC Rev 08/18

## 2018-11-13 NOTE — ANESTHESIA PREPROCEDURE EVALUATION
Anesthesia Pre-Procedure Evaluation    Patient: Carie Samson   MRN:     [id number] Gender:   female   Age:    53 year old :      1965        Preoperative Diagnosis: Abnormal Uterine Bleeding, Fibroid Uterus    Procedure(s):  Hysteroscopy, Morcellation of Submucosal Fibroid   Mirena IUD Insertion     Past Medical History:   Diagnosis Date     Hypertension      Thyroid disease       Past Surgical History:   Procedure Laterality Date     COLONOSCOPY N/A 2016    Procedure: COLONOSCOPY;  Surgeon: Mason Tee MD;  Location: [facility]     GYN SURGERY      Endometriosis- right ovary          Anesthesia Evaluation     . Pt has had prior anesthetic.     No history of anesthetic complications          ROS/MED HX    ENT/Pulmonary:       Neurologic:       Cardiovascular:     (+) hypertension----. : . . . :. .       METS/Exercise Tolerance:     Hematologic:         Musculoskeletal:         GI/Hepatic:         Renal/Genitourinary:         Endo:     (+) thyroid problem hypothyroidism, .      Psychiatric:         Infectious Disease:         Malignancy:         Other:                         PHYSICAL EXAM:   Mental Status/Neuro: A/A/O   Airway: Facies: Feasible  Mallampati: II  Mouth/Opening: Full  TM distance: > 6 cm  Neck ROM: Full   Respiratory: Auscultation: CTAB     Resp. Rate: Normal     Resp. Effort: Normal      CV: Rhythm: Regular  Rate: Age appropriate  Heart: Normal Sounds   Comments:      Dental: Normal                  Lab Results   Component Value Date    WBC 5.8 2018    HGB 13.0 2018    HCT 37.2 2018     2018    CRP 2.3 2009     2018    POTASSIUM 3.2 (L) 10/24/2018    CHLORIDE 100 2018    CO2 28 2018    BUN 12 2018    CR 0.86 2018    GLC 98 2018    LETY 8.7 2018    PHOS 2.3 (L) 2013    ALBUMIN 4.1 2013    PROTTOTAL 7.6 2013    ALT 28 2013    AST 41 2013    GGT 16 02/10/2014    ALKPHOS 59  "02/11/2013    BILITOTAL 0.7 02/11/2013    TSH 1.17 03/21/2018    T4 16.1 (H) 01/12/2009    HCG Negative 11/13/2018       Preop Vitals  BP Readings from Last 3 Encounters:   11/13/18 (!) 152/91   10/24/18 126/70   09/17/18 143/81    Pulse Readings from Last 3 Encounters:   11/13/18 84   10/24/18 68   09/17/18 61      Resp Readings from Last 3 Encounters:   11/13/18 16   04/18/16 16   01/26/11 12    SpO2 Readings from Last 3 Encounters:   11/13/18 100%   10/24/18 97%   04/18/16 100%      Temp Readings from Last 1 Encounters:   11/13/18 36.7  C (98.1  F) (Oral)    Ht Readings from Last 1 Encounters:   11/13/18 1.657 m (5' 5.24\")      Wt Readings from Last 1 Encounters:   11/13/18 60.6 kg (133 lb 9.6 oz)    Estimated body mass index is 22.07 kg/(m^2) as calculated from the following:    Height as of this encounter: 1.657 m (5' 5.24\").    Weight as of this encounter: 60.6 kg (133 lb 9.6 oz).     LDA:  Peripheral IV 11/13/18 Right Hand (Active)   Site Assessment WDL 11/13/2018  7:24 AM   Line Status Saline locked 11/13/2018  7:24 AM   Phlebitis Scale 0-->no symptoms 11/13/2018  7:24 AM   Number of days:0            Assessment:   ASA SCORE: 2    NPO Status: > 6 hours since completed [employer]   Documentation: H&P complete; Preop Testing complete; Consents complete   Proceeding: Proceed without further delay  Tobacco Use:  NO Active use of Tobacco/UNKNOWN Tobacco use status     Plan:   Anes. Type:  MAC   Pre-Induction: Midazolam IV   Induction:  IV (Standard)   Airway: Native Airway   Access/Monitoring: PIV   Maintenance: Propofol; IV   Emergence: Procedure Site   Logistics: Same Day Surgery     Postop Pain/Sedation Strategy:  Standard-Options: Opioids PRN     PONV Management:  Adult Risk Factors: Female, Non-Smoker, Postop Opioids  Prevention: Propofol Infusion; Ondansetron     CONSENT: Direct conversation   Plan and risks discussed with: Patient   Blood Products: Consent Deferred (Minimal Blood Loss)                   "   ANESTHESIA PREOP EVALUATION    NPO Status: more then 6 hours    Procedure:     HPI:     PMHx/PSHx/ROS:  PAST MEDICAL HISTORY:   Past Medical History:   Diagnosis Date     Hypertension      Thyroid disease        PAST SURGICAL HISTORY:   Past Surgical History:   Procedure Laterality Date     COLONOSCOPY N/A 4/18/2016    Procedure: COLONOSCOPY;  Surgeon: Mason Tee MD;  Location: [facility]     GYN SURGERY      Endometriosis- right ovary       FAMILY HISTORY:   Family History   Problem Relation Age of Onset     Hypertension Father      Alcohol/Drug Father      Cancer Father 69     Melanoma     Other Cancer Father      melanoma     Diabetes Paternal Grandmother      Hypertension Paternal Grandmother      Allergies Mother      GASTROINTESTINAL DISEASE Mother      Cancer Mother      lymphoma/leukemia     Other Cancer Mother      lymphoma & leukemia     Cancer Maternal Grandmother      Circulatory Paternal Grandfather      Gynecology Daughter      Other - See Comments Daughter 22     junctional rhythm--heart     GASTROINTESTINAL DISEASE Brother      Hypertension Brother      Allergies Son      Cerebrovascular Disease No family hx of      C.A.D. No family hx of      Breast Cancer No family hx of      Cancer - colorectal No family hx of      Prostate Cancer No family hx of          Past Anes Hx: No personal or family h/o anesthesia problems    Soc Hx:   Tobacco:   EtOH:    Allergies:   Allergies   Allergen Reactions     Amoxicillin Rash       Meds:   Prescriptions Prior to Admission   Medication Sig Dispense Refill Last Dose     ASPIRIN 81 MG OR TABS 1 TABLET DAILY   Past Month at Unknown time     hydrochlorothiazide (HYDRODIURIL) 25 MG tablet Take 1 tablet (25 mg) by mouth daily 90 tablet 3 11/12/2018 at 0600     KELNOR 1/35 1-35 MG-MCG per tablet TAKE 1 TABLET DAILY CONTINUOUSLY 84 tablet 1 11/13/2018 at 0530     levothyroxine (SYNTHROID/LEVOTHROID) 50 MCG tablet Take 1 tablet (50 mcg) by mouth daily 90 tablet 3  11/13/2018 at 0545     Loratadine (CLARITIN PO) Take 1 tablet by mouth daily    11/13/2018 at 0545     methylcellulose (CITRUCEL) 500 MG TABS tablet Take 500 mg by mouth daily    11/13/2018 at 0530     fluticasone (FLONASE SENSIMIST) 27.5 MCG/SPRAY spray Spray 2 sprays into both nostrils daily    More than a month at Unknown time       No current outpatient prescriptions on file.       Physical Exam:  VS: T 98.1, P 84, /91, R 16, SpO2 100%         BMP:  Lab Results   Component Value Date     03/21/2018      Lab Results   Component Value Date    POTASSIUM 3.6 11/13/2018     Lab Results   Component Value Date    CHLORIDE 100 03/21/2018     Lab Results   Component Value Date    LETY 8.7 03/21/2018     Lab Results   Component Value Date    CO2 28 03/21/2018     Lab Results   Component Value Date    BUN 12 03/21/2018     Lab Results   Component Value Date    CR 0.86 03/21/2018     Lab Results   Component Value Date    GLC 98 03/21/2018        CBC:  Lab Results   Component Value Date    WBC 5.8 11/13/2018     Lab Results   Component Value Date    HGB 13.0 11/13/2018     Lab Results   Component Value Date    HCT 37.2 11/13/2018     Lab Results   Component Value Date     11/13/2018        Coags/Type and Screen  No results found for: INR  No results found for: PT  Type and Screen:      Franklyn Pino MD    11/13/2018  7:40 AM      Luiz Pino MD

## 2018-11-13 NOTE — DISCHARGE INSTRUCTIONS
Discharge Instructions: Following a Dilation   and Curettage    What to expect:    Expect small to moderate amount of vaginal bleeding which should taper off in 4-5 days. It should not be heavier than your regular menstrual flow.    Do not douche, and use a pad rather than tampons.     No intercourse until bleeding has ceased.    Activity:    Rest the day of surgery. You may resume normal activity the next day.    You may bathe or shower.    Avoid heavy lifting (10-15 lbs) for one week.    Comfort:    The amount of discomfort you can expect is very unpredictable. If you have pain that cannot be controlled with non-aspirin pain relievers or with the prescription you may have received, you should notify your doctor.    Abdominal cramping (like menstrual cramps) or low back ache are common and should not be a cause for concern. You will be drowsy and weak the day of surgery and possibly the following day.    Diet:    You have no restrictions on your diet. Following surgery, drink plenty of fluids and eat a light meal.    Nausea:    The anesthesia medications you received during your surgical procedure may produce some nausea.    If you feel nauseated, stay in bed, keep your head down and try drinking fluids such as Seven-Up, tea or soup.    Notify Physician at once if you experience:    A fever over 100 degrees (a low grade fever under 100 degrees is usual after surgery).    Heavy flow and/or passing large clots. Saturating more than 1 pad per hour for 2 or more hours.     Severe pain or cramps.  Rev. 5/12    Same-Day Surgery   Adult Discharge Orders & Instructions     For 24 hours after surgery:  1. Get plenty of rest.  A responsible adult must stay with you for at least 24 hours after you leave the hospital.   2. Pain medication can slow your reflexes. Do not drive or use heavy equipment.  If you have weakness or tingling, don't drive or use heavy equipment until this feeling goes away.  3. Mixing alcohol and pain  medication can cause dizziness and slow your breathing. It can even be fatal. Do not drink alcohol while taking pain medication.  4. Avoid strenuous or risky activities.  Ask for help when climbing stairs.   5. You may feel lightheaded.  If so, sit for a few minutes before standing.  Have someone help you get up.   6. If you have nausea (feel sick to your stomach), drink only clear liquids such as apple juice, ginger ale, broth or 7-Up.  Rest may also help.  Be sure to drink enough fluids.  Move to a regular diet as you feel able. Take pain medications with a small amount of solid food, such as toast or crackers, to avoid nausea.   7. A slight fever is normal. Call the doctor if your fever is over 100 F (37.7 C) (taken under the tongue) or lasts longer than 24 hours.  8. You may have a dry mouth, muscle aches, trouble sleeping or a sore throat.  These symptoms should go away after 24 hours.  9. Do not make important or legal decisions.   Pain Management:      1. Take pain medication (if prescribed) for pain as directed by your physician.        2. WARNING: If the pain medication you have been prescribed contains Tylenol  (acetaminophen), DO NOT take additional doses of Tylenol (acetaminophen).     Call your doctor for any of the followin.  Signs of infection (fever, growing tenderness at the surgery site, severe pain, a large amount of drainage or bleeding, foul-smelling drainage, redness, swelling).    2.  It has been over 8 to 10 hours since surgery and you are still not able to urinate (pee).    3.  Headache for over 24 hours.    4.  Numbness, tingling or weakness the day after surgery (if you had spinal anesthesia).  To contact a doctor, call _____________________________________ or:      [phone] and ask for the Resident On Call for:          __________________________________________ (answered 24 hours a day)      Emergency Department:  [facility]: [phone]  [place]  Emergency Department: [phone]               Rev. 10/2014

## 2018-11-13 NOTE — ANESTHESIA CARE TRANSFER NOTE
Patient: Carie Samson    Procedure(s):  Hysteroscopy, Morcellation of Submucosal Fibroid, D&C, Insertion of Mirena IUD   Mirena IUD Insertion    Diagnosis: Abnormal Uterine Bleeding, Fibroid Uterus   Diagnosis Additional Information: No value filed.    Anesthesia Type:   MAC     Note:  Airway :Room Air  Patient transferred to:Phase II  Handoff Report: Identifed the Patient, Identified the Reponsible Provider, Reviewed the pertinent medical history, Discussed the surgical course, Reviewed Intra-OP anesthesia mangement and issues during anesthesia, Set expectations for post-procedure period and Allowed opportunity for questions and acknowledgement of understanding      Vitals: (Last set prior to Anesthesia Care Transfer)    CRNA VITALS  11/13/2018 0839 - 11/13/2018 0912      11/13/2018             Pulse: 72    SpO2: 100 %                Electronically Signed By: FARRAH Batres CRNA  November 13, 2018  9:12 AM

## 2018-11-13 NOTE — ANESTHESIA POSTPROCEDURE EVALUATION
Anesthesia POST Procedure Evaluation    Patient: Carie Samson   MRN:     [id number] Gender:   female   Age:    53 year old :      1965        Preoperative Diagnosis: Abnormal Uterine Bleeding, Fibroid Uterus    Procedure(s):  Hysteroscopy, Morcellation of Submucosal Fibroid, D&C, Insertion of Mirena IUD   Mirena IUD Insertion   Postop Comments: No value filed.       Anesthesia Type:  MAC    Reportable Event: NO     PAIN: Uncomplicated   Sign Out status: Comfortable, Well controlled pain     PONV: No PONV   Sign Out status:  No Nausea or Vomiting     Neuro/Psych: Uneventful perioperative course   Sign Out Status: Preoperative baseline; Age appropriate mentation     Airway/Resp.: Uneventful perioperative course   Sign Out Status: Non labored breathing, age appropriate RR; Resp. Status within EXPECTED Parameters     CV: Uneventful perioperative course   Sign Out status: Appropriate BP and perfusion indices; Appropriate HR/Rhythm     Disposition:   Sign Out in:  PACU  Recovery Course: Uneventful  Follow-Up: Not required           Last Anesthesia Record Vitals:  CRNA VITALS  2018 0839 - 2018 0912      2018             Pulse: 72    SpO2: 100 %          Last PACU/Preop Vitals:  Vitals:    18 0930 18 0945 18 1000   BP: 123/73 141/83 134/73   Pulse:      Resp: 16 16 14   Temp:   36.5  C (97.7  F)   SpO2: 99% 100% 100%         Electronically Signed By: Luiz Pino MD, 2018, 10:12 AM

## 2018-11-15 LAB — COPATH REPORT: NORMAL

## 2018-11-28 ENCOUNTER — OFFICE VISIT (OUTPATIENT)
Dept: OBGYN | Facility: CLINIC | Age: 53
End: 2018-11-28
Attending: OBSTETRICS & GYNECOLOGY
Payer: COMMERCIAL

## 2018-11-28 VITALS
HEIGHT: 65 IN | SYSTOLIC BLOOD PRESSURE: 128 MMHG | WEIGHT: 132.5 LBS | DIASTOLIC BLOOD PRESSURE: 84 MMHG | BODY MASS INDEX: 22.08 KG/M2 | HEART RATE: 91 BPM

## 2018-11-28 DIAGNOSIS — Z30.431 IUD CHECK UP: ICD-10-CM

## 2018-11-28 DIAGNOSIS — Z98.890 STATUS POST HYSTEROSCOPIC MYOMECTOMY: Primary | ICD-10-CM

## 2018-11-28 PROCEDURE — G0463 HOSPITAL OUTPT CLINIC VISIT: HCPCS | Mod: ZF

## 2018-11-28 ASSESSMENT — PAIN SCALES - GENERAL: PAINLEVEL: NO PAIN (0)

## 2018-11-28 NOTE — PROGRESS NOTES
"Post-operative Visit Note  11/28/18    Reason for visit: Post-operative Visit    S: Patient is a 52 yo P2 who underwent Hysteroscopy, morcellation of submucosal fibroid and Mirena IUD insertion on 11/13/18.  Since her procedure patient has been doing very well. Had spotting for a week and half after the procedure, no heavy bleeding or clots. No bleeding since spotting resolved. Took ibuprofen for cramps for a few days after procedure but this resolved as well. Denies fevers, chills, abdominal pain, foul smelling discharge. No bowel or bladder problems. Stopped taking OCP with procedure and started having some night sweats, stopped about a week ago. Has not had intercourse since procedure, denies vaginal pain or dryness.     O:  Vitals:    11/28/18 1021   BP: 128/84   BP Location: Left arm   Patient Position: Chair   Pulse: 91   Weight: 60.1 kg (132 lb 8 oz)   Height: 1.651 m (5' 5\")        General: NAD, A&Ox3  Resp: Non-labored breathing    Pelvic:  External Genitalia:  General appearance normal, Hair distribution normal, Lesions absent  Urethral Meatus:  Size normal, Location normal, Lesions absent, Prolapse absent  Vagina:  General appearance normal, Estrogen effect normal, Discharge absent, Lesions absent, Pelvic support normal  Cervix:  General appearance normal, Lesions absent, Discharge absent, Tenderness absent, Enlargement absent, Nodularity absent, IUD strings visible to 3 cm at 6-7 o'clock position    Lab Results   Component Value Date    PATH  11/13/2018     Patient Name: MARYLIN CARDENAS  MR#: [id number]  Specimen #: [id number]  Collected: 11/13/2018  Received: 11/13/2018  Reported: 11/15/2018 12:47  Ordering Phy(s): ADRIAN GANDHI    For improved result formatting, select 'View Enhanced Report Format' under   Linked Documents section.    SPECIMEN(S):  A: Fragments of submucousal fibroid  B: Endometrial curettings    FINAL DIAGNOSIS:  A. SUBMUCOSAL FIBROID FRAGMENTS, MORCELLATION:  - Fragments of " "leiomyoma(s)  - Small fragments of inactive endometrium with decidualized stroma,   consistent with exogenous progesterone  effect    B. ENDOMETRIUM, CURETTINGS:  - Inactive endometrium with decidualized stroma, consistent with exogenous   progesterone effect  - Rare fragments of unremarkable myometrium    I have personally reviewed all specimens and/or slides, including the   listed special stains, and used them  with my medical judgement to determine or confirm the final diagnosis.    Electronically signed out by:  Nilsa Brian M.D., PhD, [facility]    CLINICAL HISTORY:  This is a 53-year-old woman who underwent operative hysteroscopy, Myosure   morcellation of fibroid, dilation  and curettage, and Mirena IUD insertion on 11/13/2018 due to menorrhagia   and irregular periods as well as a  submucosal fibroid. She also has a reported history of endometriosis,   which was treated with long-term OCP  until 1 year ago when she started having menopausal symptoms. Her LMP was   7/4/18.    GROSS:  A:  The specimen is received in formalin with proper patient   identification, labeled \"fragments of submucosal  fibroid\".  The specimen consists of a 3.3 x 3.0 x 1.2 cm aggregate of   white tan rubbery soft tissue fragments  in a mesh fabric bag.  The specimen is filtered, wrapped, and entirely   submitted in cassette A1.    B:  The specimen is received in formalin with proper patient   identification, labeled \"endometrial curettings\".   The specimen consists of a 5.0 x 2.5 x 1.2 cm aggregate of pink-tan soft   tissue admixed with mucus and  primarily  Blood clot.  The specimen is filtered, wrapped and entirely   submitted in cassette B1-3. (Dictated  by: Cristal [facility] 11/13/2018 01:03 PM)    MICROSCOPIC:  Microscopic examination was performed.    CPT Codes:  A: 94803-CK6  B: 45325-TH2    TESTING LAB LOCATION:  [facility], [address]  [address] [place] [address] " [id number]  [phone]    COLLECTION SITE:  Client: [facility], [place]  Location: UROR (B)    Resident  MXT1       A/P: 52 yo P2 presents for post-operative visit s/p Hysteroscopy, Morcellation of submucosal fibroid and Mirena IUD insertion  1) Post-operative state: Stable post-operative course with significant improvement in AUB. Discussed with patient to call or RTC if heavy bleeding or pain reoccurs.  2) IUD check: strings visible today at 3 cm with no signs of malpositioning. Discussed with patient can stay in place for 5 years.    RTC in 1 year for next preventative care visit.    Pt seen and discussed with Dr. Hernandez.    Sarah Marin, MS3  Pager: [phone]    Physician Attestation   I, Cher Hernandez, was present with the medical student who participated in the service and in the documentation of the note.  I have verified the history and personally performed the physical exam and medical decision making.  I agree with the assessment and plan of care as documented in the note.      Cher Hernandez MD

## 2018-11-28 NOTE — MR AVS SNAPSHOT
After Visit Summary   11/28/2018    Carie Samson    MRN: [id number]           Patient Information     Date Of Birth          1965        Visit Information        Provider Department      11/28/2018 10:15 AM Cher Hernandez MD [facility]        Today's Diagnoses     Status post hysteroscopic myomectomy    -  1    IUD check up           Follow-ups after your visit        Your next 10 appointments already scheduled     Dec 17, 2018  1:15 PM CST   Return Visit with Cher Hernandez MD   [facility] ([facility])    [place] [address]  [address]  [address]  [facility] [id number] [phone]              Who to contact     Please call your clinic at [phone] to:    Ask questions about your health    Make or cancel appointments    Discuss your medicines    Learn about your test results    Speak to your doctor            Additional Information About Your Visit        MyChart Information     [employer] gives you secure access to your electronic health record. If you see a primary care provider, you can also send messages to your care team and make appointments. If you have questions, please call your primary care clinic.  If you do not have a primary care provider, please call [phone] and they will assist you.      [employer] is an electronic gateway that provides easy, online access to your medical records. With [employer], you can request a clinic appointment, read your test results, renew a prescription or communicate with your care team.     To access your existing account, please contact your [facility] or call [phone] for assistance.        Care EveryWhere ID     This is your Care EveryWhere ID. This could be used by other organizations to access your [place] medical records  [id number]        Your Vitals Were     Pulse Height Breastfeeding? BMI (Body Mass Index)           "91 1.651 m (5' 5\") No 22.05 kg/m2         Blood Pressure from Last 3 Encounters:   11/28/18 128/84   11/13/18 134/73   10/24/18 126/70    Weight from Last 3 Encounters:   11/28/18 60.1 kg (132 lb 8 oz)   11/13/18 60.6 kg (133 lb 9.6 oz)   10/24/18 60.8 kg (134 lb)              Today, you had the following     No orders found for display       Primary Care Provider Office Phone # Fax #    Nancy Sanz [phone] [address] [facility] [address]        Equal Access to Services     NEL YUN : Hadii randee hernandez Sobetzaida, waisabelle saucedo, qabo kaalmada lenora, stanley potter. So [facility] [place] [facility] [phone].    ATENCIÓN: Si habla español, tiene a hoskins disposición servicios gratuitos de asistencia lingüística. Llame al [phone].    We comply with applicable federal civil rights laws and [place] laws. We do not discriminate on the basis of race, color, national origin, age, disability, sex, sexual orientation, or gender identity.            Thank you!     Thank you for choosing [facility]  for your care. Our goal is always to provide you with excellent care. Hearing back from our patients is one way we can continue to improve our services. Please take a few minutes to complete the written survey that you may receive in the mail after your visit with us. Thank you!             Your Updated Medication List - Protect others around you: Learn how to safely use, store and throw away your medicines at www.disposemymeds.org.          This list is accurate as of 11/28/18  4:17 PM.  Always use your most recent med list.                   Brand Name Dispense Instructions for use Diagnosis    CLARITIN PO      Take 1 tablet by mouth daily        FISH OIL PO           FLONASE SENSIMIST 27.5 MCG/SPRAY nasal spray   Generic drug:  fluticasone      Spray 2 sprays into both nostrils daily        hydrochlorothiazide 25 MG tablet    HYDRODIURIL    90 " tablet    Take 1 tablet (25 mg) by mouth daily    Benign essential hypertension       levonorgestrel 20 MCG/24HR IUD    MIRENA     1 each by Intrauterine route once        levothyroxine 50 MCG tablet    SYNTHROID/LEVOTHROID    90 tablet    Take 1 tablet (50 mcg) by mouth daily    Hypothyroidism due to acquired atrophy of thyroid       methylcellulose 500 MG Tabs tablet    CITRUCEL     Take 500 mg by mouth daily

## 2018-11-28 NOTE — LETTER
"11/28/2018       RE: Carie Cardenas  [address] [facility] [address]     Dear Colleague,    Thank you for referring your patient, Carie Cardenas, to the [facility] at [facility]. Please see a copy of my visit note below.    Post-operative Visit Note  11/28/18    Reason for visit: Post-operative Visit    S: Patient is a 54 yo P2 who underwent Hysteroscopy, morcellation of submucosal fibroid and Mirena IUD insertion on 11/13/18.  Since her procedure patient has been doing very well. Had spotting for a week and half after the procedure, no heavy bleeding or clots. No bleeding since spotting resolved. Took ibuprofen for cramps for a few days after procedure but this resolved as well. Denies fevers, chills, abdominal pain, foul smelling discharge. No bowel or bladder problems. Stopped taking OCP with procedure and started having some night sweats, stopped about a week ago. Has not had intercourse since procedure, denies vaginal pain or dryness.     O:  Vitals:    11/28/18 1021   BP: 128/84   BP Location: Left arm   Patient Position: Chair   Pulse: 91   Weight: 60.1 kg (132 lb 8 oz)   Height: 1.651 m (5' 5\")        General: NAD, A&Ox3  Resp: Non-labored breathing    Pelvic:  External Genitalia:  General appearance normal, Hair distribution normal, Lesions absent  Urethral Meatus:  Size normal, Location normal, Lesions absent, Prolapse absent  Vagina:  General appearance normal, Estrogen effect normal, Discharge absent, Lesions absent, Pelvic support normal  Cervix:  General appearance normal, Lesions absent, Discharge absent, Tenderness absent, Enlargement absent, Nodularity absent, IUD strings visible to 3 cm at 6-7 o'clock position    Lab Results   Component Value Date    PATH  11/13/2018     Patient Name: CARIE CARDENAS  MR#: [id number]  Specimen #: [id number]  Collected: 11/13/2018  Received: 11/13/2018  Reported: 11/15/2018 " "12:47  Ordering Phy(s): ADRIAN GANDHI    For improved result formatting, select 'View Enhanced Report Format' under   Linked Documents section.    SPECIMEN(S):  A: Fragments of submucousal fibroid  B: Endometrial curettings    FINAL DIAGNOSIS:  A. SUBMUCOSAL FIBROID FRAGMENTS, MORCELLATION:  - Fragments of leiomyoma(s)  - Small fragments of inactive endometrium with decidualized stroma,   consistent with exogenous progesterone  effect    B. ENDOMETRIUM, CURETTINGS:  - Inactive endometrium with decidualized stroma, consistent with exogenous   progesterone effect  - Rare fragments of unremarkable myometrium    I have personally reviewed all specimens and/or slides, including the   listed special stains, and used them  with my medical judgement to determine or confirm the final diagnosis.    Electronically signed out by:  Nilsa Brian M.D., PhD, [facility]    CLINICAL HISTORY:  This is a 53-year-old woman who underwent operative hysteroscopy, Myosure   morcellation of fibroid, dilation  and curettage, and Mirena IUD insertion on 11/13/2018 due to menorrhagia   and irregular periods as well as a  submucosal fibroid. She also has a reported history of endometriosis,   which was treated with long-term OCP  until 1 year ago when she started having menopausal symptoms. Her LMP was   7/4/18.    GROSS:  A:  The specimen is received in formalin with proper patient   identification, labeled \"fragments of submucosal  fibroid\".  The specimen consists of a 3.3 x 3.0 x 1.2 cm aggregate of   white tan rubbery soft tissue fragments  in a mesh fabric bag.  The specimen is filtered, wrapped, and entirely   submitted in cassette A1.    B:  The specimen is received in formalin with proper patient   identification, labeled \"endometrial curettings\".   The specimen consists of a 5.0 x 2.5 x 1.2 cm aggregate of pink-tan soft   tissue admixed with mucus and  primarily  Blood clot.  The specimen is filtered, wrapped and entirely "   submitted in cassette B1-3. (Dictated  by: Cristal [facility]eno [facility] 11/13/2018 01:03 PM)    MICROSCOPIC:  Microscopic examination was performed.    CPT Codes:  A: 44500-IQ4  B: 80527-VX1    TESTING LAB LOCATION:  [facility], [address] [place] [address]  [phone]    COLLECTION SITE:  Client: [facility]  Location: [facility] (B)    Resident  MXT1       A/P: 52 yo P2 presents for post-operative visit s/p Hysteroscopy, Morcellation of submucosal fibroid and Mirena IUD insertion  1) Post-operative state: Stable post-operative course with significant improvement in AUB. Discussed with patient to call or RTC if heavy bleeding or pain reoccurs.  2) IUD check: strings visible today at 3 cm with no signs of malpositioning. Discussed with patient can stay in place for 5 years.    RTC in 1 year for next preventative care visit.    Pt seen and discussed with Dr. Hernandez.    Sarah Marin, MS3  Pager: [phone]    Physician Attestation   I, Cher Hernandez, was present with the medical student who participated in the service and in the documentation of the note.  I have verified the history and personally performed the physical exam and medical decision making.  I agree with the assessment and plan of care as documented in the note.      Cher Hernandez MD

## 2018-12-03 ENCOUNTER — MYC MEDICAL ADVICE (OUTPATIENT)
Dept: FAMILY MEDICINE | Facility: CLINIC | Age: 53
End: 2018-12-03

## 2019-04-28 DIAGNOSIS — I10 ESSENTIAL HYPERTENSION: ICD-10-CM

## 2019-04-28 DIAGNOSIS — E03.4 HYPOTHYROIDISM DUE TO ACQUIRED ATROPHY OF THYROID: ICD-10-CM

## 2019-04-28 DIAGNOSIS — Z13.6 CARDIOVASCULAR SCREENING; LDL GOAL LESS THAN 160: Primary | ICD-10-CM

## 2019-04-29 NOTE — TELEPHONE ENCOUNTER
"Requested Prescriptions   Pending Prescriptions Disp Refills     levothyroxine (SYNTHROID/LEVOTHROID) 50 MCG tablet [Pharmacy Med Name:  Last Written Prescription Date:  03/21/18  Last Fill Quantity: 90,  # refills: 3   Last office visit: 10/24/2018 with prescribing provider:  Nancy Sanz   Future Office Visit:   Next 5 appointments (look out 90 days)    May 13, 2019  8:00 AM CDT  MyChart Physical Adult with Nancy Sanz MD  [facility] ([facility] [address] [facility] [address]  [phone]          L-THYROXINE (SYNTHROID) TABS 50MCG] 90 tablet 3     Sig: TAKE 1 TABLET DAILY       Thyroid Protocol Failed - 4/28/2019 11:57 PM        Failed - Normal TSH on file in past 12 months     Recent Labs   Lab Test 03/21/18  0943   TSH 1.17              Passed - Patient is 12 years or older        Passed - Recent (12 mo) or future (30 days) visit within the authorizing provider's specialty     Patient had office visit in the last 12 months or has a visit in the next 30 days with authorizing provider or within the authorizing provider's specialty.  See \"Patient Info\" tab in inbasket, or \"Choose Columns\" in Meds & Orders section of the refill encounter.          Passed - Medication is active on med list        Passed - No active pregnancy on record     If patient is pregnant or has had a positive pregnancy test, please check TSH        Passed - No positive pregnancy test in past 12 months     If patient is pregnant or has had a positive pregnancy test, please check TSH.            "

## 2019-04-30 RX ORDER — LEVOTHYROXINE SODIUM 50 UG/1
TABLET ORAL
Qty: 90 TABLET | Refills: 0 | Status: SHIPPED | OUTPATIENT
Start: 2019-04-30 | End: 2019-05-13

## 2019-04-30 NOTE — TELEPHONE ENCOUNTER
Medication is being filled for 1 time refill only due to:  Patient needs labs fasting lipids,bmp,microalbumin, thyroid. Future labs ordered yes.   Ari Brown RN

## 2019-05-13 ENCOUNTER — OFFICE VISIT (OUTPATIENT)
Dept: FAMILY MEDICINE | Facility: CLINIC | Age: 54
End: 2019-05-13
Payer: COMMERCIAL

## 2019-05-13 VITALS
HEART RATE: 65 BPM | SYSTOLIC BLOOD PRESSURE: 136 MMHG | HEIGHT: 66 IN | BODY MASS INDEX: 21.69 KG/M2 | WEIGHT: 135 LBS | DIASTOLIC BLOOD PRESSURE: 86 MMHG | TEMPERATURE: 97.6 F

## 2019-05-13 DIAGNOSIS — E03.4 HYPOTHYROIDISM DUE TO ACQUIRED ATROPHY OF THYROID: ICD-10-CM

## 2019-05-13 DIAGNOSIS — I10 BENIGN ESSENTIAL HYPERTENSION: ICD-10-CM

## 2019-05-13 DIAGNOSIS — Z13.6 CARDIOVASCULAR SCREENING; LDL GOAL LESS THAN 160: ICD-10-CM

## 2019-05-13 DIAGNOSIS — Z00.00 ROUTINE GENERAL MEDICAL EXAMINATION AT A HEALTH CARE FACILITY: Primary | ICD-10-CM

## 2019-05-13 DIAGNOSIS — I10 ESSENTIAL HYPERTENSION: ICD-10-CM

## 2019-05-13 LAB
ANION GAP SERPL CALCULATED.3IONS-SCNC: 4 MMOL/L (ref 3–14)
BUN SERPL-MCNC: 17 MG/DL (ref 7–30)
CALCIUM SERPL-MCNC: 9.1 MG/DL (ref 8.5–10.1)
CHLORIDE SERPL-SCNC: 109 MMOL/L (ref 94–109)
CHOLEST SERPL-MCNC: 178 MG/DL
CO2 SERPL-SCNC: 28 MMOL/L (ref 20–32)
CREAT SERPL-MCNC: 0.87 MG/DL (ref 0.52–1.04)
CREAT UR-MCNC: 102 MG/DL
GFR SERPL CREATININE-BSD FRML MDRD: 76 ML/MIN/{1.73_M2}
GLUCOSE SERPL-MCNC: 98 MG/DL (ref 70–99)
HDLC SERPL-MCNC: 71 MG/DL
LDLC SERPL CALC-MCNC: 94 MG/DL
MICROALBUMIN UR-MCNC: <5 MG/L
MICROALBUMIN/CREAT UR: NORMAL MG/G CR (ref 0–25)
NONHDLC SERPL-MCNC: 107 MG/DL
POTASSIUM SERPL-SCNC: 4.4 MMOL/L (ref 3.4–5.3)
SODIUM SERPL-SCNC: 141 MMOL/L (ref 133–144)
TRIGL SERPL-MCNC: 65 MG/DL
TSH SERPL DL<=0.005 MIU/L-ACNC: 1.54 MU/L (ref 0.4–4)

## 2019-05-13 PROCEDURE — 99396 PREV VISIT EST AGE 40-64: CPT | Performed by: PHYSICIAN ASSISTANT

## 2019-05-13 PROCEDURE — 80061 LIPID PANEL: CPT | Performed by: FAMILY MEDICINE

## 2019-05-13 PROCEDURE — 84443 ASSAY THYROID STIM HORMONE: CPT | Performed by: FAMILY MEDICINE

## 2019-05-13 PROCEDURE — 36415 COLL VENOUS BLD VENIPUNCTURE: CPT | Performed by: FAMILY MEDICINE

## 2019-05-13 PROCEDURE — 80048 BASIC METABOLIC PNL TOTAL CA: CPT | Performed by: FAMILY MEDICINE

## 2019-05-13 PROCEDURE — 82043 UR ALBUMIN QUANTITATIVE: CPT | Performed by: FAMILY MEDICINE

## 2019-05-13 RX ORDER — HYDROCHLOROTHIAZIDE 25 MG/1
25 TABLET ORAL DAILY
Qty: 90 TABLET | Refills: 3 | Status: SHIPPED | OUTPATIENT
Start: 2019-05-13 | End: 2020-04-16

## 2019-05-13 RX ORDER — LEVOTHYROXINE SODIUM 50 UG/1
50 TABLET ORAL DAILY
Qty: 90 TABLET | Refills: 3 | Status: SHIPPED | OUTPATIENT
Start: 2019-05-13 | End: 2020-06-30

## 2019-05-13 ASSESSMENT — MIFFLIN-ST. JEOR: SCORE: 1226.17

## 2019-05-13 NOTE — PROGRESS NOTES
SUBJECTIVE:   CC: Carie Samson is an 53 year old woman who presents for preventive health visit.     Healthy Habits:    Do you get at least three servings of calcium containing foods daily (dairy, green leafy vegetables, etc.)? yes    Amount of exercise or daily activities, outside of work: 4 day(s) per week    Problems taking medications regularly No    Medication side effects: No    Have you had an eye exam in the past two years? yes    Do you see a dentist twice per year? yes    Do you have sleep apnea, excessive snoring or daytime drowsiness?yes -  notices snores has increased     Patient is here for a yearly physical, she has no real questions or concerns, she has been doing well on her medications, she did fasting blood work this morning. She is up to date on health maintenance.   -------------------------------------    Today's PHQ-2 Score:   PHQ-2 ( 1999 [employer]) 5/13/2019 8/6/2018   Q1: Little interest or pleasure in doing things 0 0   Q2: Feeling down, depressed or hopeless 0 0   PHQ-2 Score 0 0   Q1: Little interest or pleasure in doing things - Not at all   Q2: Feeling down, depressed or hopeless - Not at all   PHQ-2 Score - 0       Abuse: Current or Past(Physical, Sexual or Emotional)- No  Do you feel safe in your environment? Yes    Social History     Tobacco Use     Smoking status: Never Smoker     Smokeless tobacco: Never Used   Substance Use Topics     Alcohol use: Yes     Comment: Weekends     If you drink alcohol do you typically have >3 drinks per day or >7 drinks per week? No                     Reviewed orders with patient.  Reviewed health maintenance and updated orders accordingly - Yes  BP Readings from Last 3 Encounters:   05/13/19 136/86   11/28/18 128/84   11/13/18 134/73    Wt Readings from Last 3 Encounters:   05/13/19 61.2 kg (135 lb)   11/28/18 60.1 kg (132 lb 8 oz)   11/13/18 60.6 kg (133 lb 9.6 oz)           Mammogram Screening: Patient over age 50, mutual decision  "to screen reflected in health maintenance.    Pertinent mammograms are reviewed under the imaging tab.  History of abnormal Pap smear: NO - age 30- 65 PAP every 3 years recommended  PAP / HPV Latest Ref Rng & Units 3/8/2017 2/10/2014 1/26/2011   PAP - NIL NIL NIL   HPV 16 DNA NEG Negative - -   HPV 18 DNA NEG Negative - -   OTHER HR HPV NEG Negative - -     Reviewed and updated as needed this visit by clinical staff  Tobacco  Allergies  Meds  Problems  Med Hx  Surg Hx  Fam Hx  Soc Hx          Reviewed and updated as needed this visit by Provider  Tobacco  Allergies  Meds  Problems  Med Hx  Surg Hx  Fam Hx  Soc Hx             ROS:  CONSTITUTIONAL: NEGATIVE for fever, chills, change in weight  INTEGUMENTARU/SKIN: NEGATIVE for worrisome rashes, moles or lesions  EYES: NEGATIVE for vision changes or irritation  ENT: NEGATIVE for ear, mouth and throat problems  RESP: NEGATIVE for significant cough or SOB  BREAST: NEGATIVE for masses, tenderness or discharge  CV: NEGATIVE for chest pain, palpitations or peripheral edema  GI: NEGATIVE for nausea, abdominal pain, heartburn, or change in bowel habits  : NEGATIVE for unusual urinary or vaginal symptoms.   MUSCULOSKELETAL: NEGATIVE for significant arthralgias or myalgia  NEURO: NEGATIVE for weakness, dizziness or paresthesias  PSYCHIATRIC: NEGATIVE for changes in mood or affect    OBJECTIVE:   /86   Pulse 65   Temp 97.6  F (36.4  C) (Tympanic)   Ht 1.664 m (5' 5.5\")   Wt 61.2 kg (135 lb)   BMI 22.12 kg/m    EXAM:  GENERAL: healthy, alert and no distress  EYES: Eyes grossly normal to inspection, PERRL and conjunctivae and sclerae normal  HENT: ear canals and TM's normal, nose and mouth without ulcers or lesions  NECK: no adenopathy, no asymmetry, masses, or scars and thyroid normal to palpation  RESP: lungs clear to auscultation - no rales, rhonchi or wheezes  CV: regular rates and rhythm, normal S1 S2, no S3 or S4, no murmur, click or rub, peripheral " "pulses strong and no peripheral edema  MS: no gross musculoskeletal defects noted, no edema  SKIN: no suspicious lesions or rashes  PSYCH: mentation appears normal, affect normal/bright    Diagnostic Test Results:  No results found for this or any previous visit (from the past 24 hour(s)).    ASSESSMENT/PLAN:       ICD-10-CM    1. Routine general medical examination at a health care facility Z00.00    2. Benign essential hypertension I10 hydrochlorothiazide (HYDRODIURIL) 25 MG tablet   3. Hypothyroidism due to acquired atrophy of thyroid E03.4 levothyroxine (SYNTHROID/LEVOTHROID) 50 MCG tablet       COUNSELING:   Reviewed preventive health counseling, as reflected in patient instructions       Regular exercise       Healthy diet/nutrition       Colon cancer screening    Estimated body mass index is 22.12 kg/m  as calculated from the following:    Height as of this encounter: 1.664 m (5' 5.5\").    Weight as of this encounter: 61.2 kg (135 lb).         reports that she has never smoked. She has never used smokeless tobacco.      Counseling Resources:  ATP IV Guidelines  Pooled Cohorts Equation Calculator  Breast Cancer Risk Calculator  FRAX Risk Assessment  ICSI Preventive Guidelines  Dietary Guidelines for Americans, 2010  USDA's MyPlate  ASA Prophylaxis  Lung CA Screening    Bushra Fuentes PA-C  [facility]  "

## 2019-11-06 ENCOUNTER — HEALTH MAINTENANCE LETTER (OUTPATIENT)
Age: 54
End: 2019-11-06

## 2020-01-22 ENCOUNTER — ALLIED HEALTH/NURSE VISIT (OUTPATIENT)
Dept: FAMILY MEDICINE | Facility: CLINIC | Age: 55
End: 2020-01-22
Payer: COMMERCIAL

## 2020-01-22 DIAGNOSIS — Z23 NEED FOR SHINGLES VACCINE: Primary | ICD-10-CM

## 2020-01-22 PROCEDURE — 90750 HZV VACC RECOMBINANT IM: CPT

## 2020-01-22 PROCEDURE — 99207 ZZC NO CHARGE NURSE ONLY: CPT

## 2020-01-22 PROCEDURE — 90471 IMMUNIZATION ADMIN: CPT

## 2020-01-22 NOTE — PROGRESS NOTES
Prior to immunization administration, verified patients identity using patient s name and date of birth. Please see Immunization Activity for additional information.     Screening Questionnaire for Adult Immunization    Are you sick today?   No   Do you have allergies to medications, food, a vaccine component or latex?   No   Have you ever had a serious reaction after receiving a vaccination?   No   Do you have a long-term health problem with heart, lung, kidney, or metabolic disease (e.g., diabetes), asthma, a blood disorder, no spleen, complement component deficiency, a cochlear implant, or a spinal fluid leak?  Are you on long-term aspirin therapy?   No   Do you have cancer, leukemia, HIV/AIDS, or any other immune system problem?   No   Do you have a parent, brother, or sister with an immune system problem?   No   In the past 3 months, have you taken medications that affect  your immune system, such as prednisone, other steroids, or anticancer drugs; drugs for the treatment of rheumatoid arthritis, Crohn s disease, or psoriasis; or have you had radiation treatments?   No   Have you had a seizure, or a brain or other nervous system problem?   No   During the past year, have you received a transfusion of blood or blood    products, or been given immune (gamma) globulin or antiviral drug?   No   For women: Are you pregnant or is there a chance you could become       pregnant during the next month?   No   Have you received any vaccinations in the past 4 weeks?   No     Immunization questionnaire answers were all negative.        Per orders of Dr. Nancy Sanz MD , injection of Shingrix given by Cherise Ghotra CMA. Patient instructed to remain in clinic for 15 minutes afterwards, and to report any adverse reaction to me immediately.       Screening performed by Cherise Ghotra CMA on 1/22/2020 at 8:49 AM.

## 2020-04-16 DIAGNOSIS — I10 BENIGN ESSENTIAL HYPERTENSION: ICD-10-CM

## 2020-04-16 RX ORDER — HYDROCHLOROTHIAZIDE 25 MG/1
TABLET ORAL
Qty: 90 TABLET | Refills: 3 | Status: SHIPPED | OUTPATIENT
Start: 2020-04-16

## 2020-06-29 DIAGNOSIS — E03.4 HYPOTHYROIDISM DUE TO ACQUIRED ATROPHY OF THYROID: ICD-10-CM

## 2020-06-30 RX ORDER — LEVOTHYROXINE SODIUM 50 UG/1
TABLET ORAL
Qty: 30 TABLET | Refills: 0 | Status: SHIPPED | OUTPATIENT
Start: 2020-06-30

## 2020-06-30 NOTE — TELEPHONE ENCOUNTER
Routing refill request to provider for review/approval because:  Labs not current:    Anne Hitchcock RN

## 2020-06-30 NOTE — TELEPHONE ENCOUNTER
Refilled x1, in case dose is changed. She has appointment with Dr. Sanz in 2 weeks  Jody Siddiqi PA-C

## 2020-11-29 ENCOUNTER — HEALTH MAINTENANCE LETTER (OUTPATIENT)
Age: 55
End: 2020-11-29

## 2021-01-20 ENCOUNTER — IMMUNIZATION (OUTPATIENT)
Dept: NURSING | Facility: CLINIC | Age: 56
End: 2021-01-20
Payer: COMMERCIAL

## 2021-01-20 PROCEDURE — 0001A PR COVID VAC PFIZER DIL RECON 30 MCG/0.3 ML IM: CPT

## 2021-01-20 PROCEDURE — 91300 PR COVID VAC PFIZER DIL RECON 30 MCG/0.3 ML IM: CPT

## 2021-02-10 ENCOUNTER — IMMUNIZATION (OUTPATIENT)
Dept: NURSING | Facility: CLINIC | Age: 56
End: 2021-02-10
Attending: FAMILY MEDICINE
Payer: COMMERCIAL

## 2021-02-10 PROCEDURE — 91300 PR COVID VAC PFIZER DIL RECON 30 MCG/0.3 ML IM: CPT

## 2021-02-10 PROCEDURE — 0002A PR COVID VAC PFIZER DIL RECON 30 MCG/0.3 ML IM: CPT

## 2021-05-25 ENCOUNTER — RECORDS - HEALTHEAST (OUTPATIENT)
Dept: ADMINISTRATIVE | Facility: CLINIC | Age: 56
End: 2021-05-25

## 2021-05-27 ENCOUNTER — RECORDS - HEALTHEAST (OUTPATIENT)
Dept: ADMINISTRATIVE | Facility: CLINIC | Age: 56
End: 2021-05-27

## 2021-05-31 ENCOUNTER — RECORDS - HEALTHEAST (OUTPATIENT)
Dept: ADMINISTRATIVE | Facility: CLINIC | Age: 56
End: 2021-05-31

## 2021-09-19 ENCOUNTER — HEALTH MAINTENANCE LETTER (OUTPATIENT)
Age: 56
End: 2021-09-19

## 2022-01-09 ENCOUNTER — HEALTH MAINTENANCE LETTER (OUTPATIENT)
Age: 57
End: 2022-01-09

## 2022-03-27 NOTE — IP AVS SNAPSHOT
MRN:[id number]                      After Visit Summary   11/13/2018    Carie Samson    MRN: [id number]           Thank you!     Thank you for choosing [facility] for your care. Our goal is always to provide you with excellent care. Hearing back from our patients is one way we can continue to improve our services. Please take a few minutes to complete the written survey that you may receive in the mail after you visit with us. Thank you!        Patient Information     Date Of Birth          1965        About your hospital stay     You were admitted on:  November 13, 2018 You last received care in the:  [facility] PREOP/PHASE II    You were discharged on:  November 13, 2018       Who to Call     For medical emergencies, please call 911.  For non-urgent questions about your medical care, please call your primary care provider or clinic, [phone]  For questions related to your surgery, please call your surgery clinic        Attending Provider     Provider Cher Murphy MD OB/Gyn       Primary Care Provider Office Phone # Fax #    Nancy Sanz [phone]      After Care Instructions     Discharge Instructions       Resume pre procedure diet            Discharge Instructions       Pelvic Rest. No tampons, douching or intercourse for  2  weeks.            Discharge Instructions       Please follow up with Dr. Hernandez as previously scheduled for postop check at [facility].   Appointments: [phone]  [facility]   [address], [address]  [address] [place] [address]. [place] [address]            No alcohol       NO ALCOHOL for 24 hours post procedure            No driving or operating machinery       No driving or operating machinery until day after procedure                  Your next 10 appointments already scheduled     Nov 28, 2018 10:15 AM CST   Return Visit with Cher Hrenandez MD   [facility] ([facility]  [facility])    [facility] [address]  [address]  [address]  [facility] [address]   [phone]            Dec 17, 2018  1:15 PM CST   Return Visit with Cher Hernandez MD   [facility] ([facility])    [facility] [address]  [address]  [facility] [address]   [phone]              Further instructions from your care team       Discharge Instructions: Following a Dilation   and Curettage    What to expect:    Expect small to moderate amount of vaginal bleeding which should taper off in 4-5 days. It should not be heavier than your regular menstrual flow.    Do not douche, and use a pad rather than tampons.     No intercourse until bleeding has ceased.    Activity:    Rest the day of surgery. You may resume normal activity the next day.    You may bathe or shower.    Avoid heavy lifting (10-15 lbs) for one week.    Comfort:    The amount of discomfort you can expect is very unpredictable. If you have pain that cannot be controlled with non-aspirin pain relievers or with the prescription you may have received, you should notify your doctor.    Abdominal cramping (like menstrual cramps) or low back ache are common and should not be a cause for concern. You will be drowsy and weak the day of surgery and possibly the following day.    Diet:    You have no restrictions on your diet. Following surgery, drink plenty of fluids and eat a light meal.    Nausea:    The anesthesia medications you received during your surgical procedure may produce some nausea.    If you feel nauseated, stay in bed, keep your head down and try drinking fluids such as Seven-Up, tea or soup.    Notify Physician at once if you experience:    A fever over 100 degrees (a low grade fever under 100 degrees is usual after surgery).    Heavy flow and/or passing large clots. Saturating more than 1 pad per hour for 2 or more hours.     Severe pain or  cramps.  Rev.     Same-Day Surgery   Adult Discharge Orders & Instructions     For 24 hours after surgery:  1. Get plenty of rest.  A responsible adult must stay with you for at least 24 hours after you leave the hospital.   2. Pain medication can slow your reflexes. Do not drive or use heavy equipment.  If you have weakness or tingling, don't drive or use heavy equipment until this feeling goes away.  3. Mixing alcohol and pain medication can cause dizziness and slow your breathing. It can even be fatal. Do not drink alcohol while taking pain medication.  4. Avoid strenuous or risky activities.  Ask for help when climbing stairs.   5. You may feel lightheaded.  If so, sit for a few minutes before standing.  Have someone help you get up.   6. If you have nausea (feel sick to your stomach), drink only clear liquids such as apple juice, ginger ale, broth or 7-Up.  Rest may also help.  Be sure to drink enough fluids.  Move to a regular diet as you feel able. Take pain medications with a small amount of solid food, such as toast or crackers, to avoid nausea.   7. A slight fever is normal. Call the doctor if your fever is over 100 F (37.7 C) (taken under the tongue) or lasts longer than 24 hours.  8. You may have a dry mouth, muscle aches, trouble sleeping or a sore throat.  These symptoms should go away after 24 hours.  9. Do not make important or legal decisions.   Pain Management:      1. Take pain medication (if prescribed) for pain as directed by your physician.        2. WARNING: If the pain medication you have been prescribed contains Tylenol  (acetaminophen), DO NOT take additional doses of Tylenol (acetaminophen).     Call your doctor for any of the followin.  Signs of infection (fever, growing tenderness at the surgery site, severe pain, a large amount of drainage or bleeding, foul-smelling drainage, redness, swelling).    2.  It has been over 8 to 10 hours since surgery and you are still not able to  "urinate (pee).    3.  Headache for over 24 hours.    4.  Numbness, tingling or weakness the day after surgery (if you had spinal anesthesia).  To contact a doctor, call _____________________________________ or:      [phone] and ask for the Resident On Call for:          __________________________________________ (answered 24 hours a day)      Emergency Department:  [facility]: [phone]  [facility]: [phone]               Rev. 10/2014             Pending Results     No orders found from 11/11/2018 to 11/14/2018.            Admission Information     Date & Time Provider Department Dept. Phone    11/13/2018 Cher Hernandez MD [facility] PREOP/PHASE [phone]      Your Vitals Were     Blood Pressure Pulse Temperature Respirations Height Weight    125/75 84 97.7  F (36.5  C) (Oral) 12 1.657 m (5' 5.24\") 60.6 kg (133 lb 9.6 oz)    Pulse Oximetry BMI (Body Mass Index)                99% 22.07 kg/m2          MyChart Information     [employer] gives you secure access to your electronic health record. If you see a primary care provider, you can also send messages to your care team and make appointments. If you have questions, please call your primary care clinic.  If you do not have a primary care provider, please call [phone] and they will assist you.        Care EveryWhere ID     This is your Care EveryWhere ID. This could be used by other organizations to access your [facility] medical records  [id number]        Equal Access to Services     LENNY YUN : Siobhan Frost, olamide saucedo, stanley van. So [facility] [phone].    ATENCIÓN: Si habla español, tiene a hoskins disposición servicios gratuitos de asistencia lingüística. Llame al [phone].    We comply with applicable federal civil rights laws and [place] laws. We do not discriminate on the basis of race, color, national origin, age, " Yes disability, sex, sexual orientation, or gender identity.               Review of your medicines      CONTINUE these medicines which have NOT CHANGED        Dose / Directions    aspirin 81 MG tablet        1 TABLET DAILY   Refills:  0       CLARITIN PO        Dose:  1 tablet   Take 1 tablet by mouth daily   Refills:  0       FLONASE SENSIMIST 27.5 MCG/SPRAY spray   Generic drug:  fluticasone        Dose:  2 spray   Spray 2 sprays into both nostrils daily   Refills:  0       hydrochlorothiazide 25 MG tablet   Commonly known as:  HYDRODIURIL   Used for:  Benign essential hypertension        Dose:  25 mg   Take 1 tablet (25 mg) by mouth daily   Quantity:  90 tablet   Refills:  3       levothyroxine 50 MCG tablet   Commonly known as:  SYNTHROID/LEVOTHROID   Used for:  Hypothyroidism due to acquired atrophy of thyroid        Dose:  50 mcg   Take 1 tablet (50 mcg) by mouth daily   Quantity:  90 tablet   Refills:  3       methylcellulose 500 MG Tabs tablet   Commonly known as:  CITRUCEL        Dose:  500 mg   Take 500 mg by mouth daily   Refills:  0         STOP taking     KELNOR 1/35 1-35 MG-MCG per tablet   Generic drug:  ethynodiol-ethinyl estradiol                    Protect others around you: Learn how to safely use, store and throw away your medicines at www.disposemymeds.org.             Medication List: This is a list of all your medications and when to take them. Check marks below indicate your daily home schedule. Keep this list as a reference.      Medications           Morning Afternoon Evening Bedtime As Needed    aspirin 81 MG tablet   1 TABLET DAILY                                CLARITIN PO   Take 1 tablet by mouth daily                                FLONASE SENSIMIST 27.5 MCG/SPRAY spray   Spray 2 sprays into both nostrils daily   Generic drug:  fluticasone                                hydrochlorothiazide 25 MG tablet   Commonly known as:  HYDRODIURIL   Take 1 tablet (25 mg) by mouth daily                                 levothyroxine 50 MCG tablet   Commonly known as:  SYNTHROID/LEVOTHROID   Take 1 tablet (50 mcg) by mouth daily                                methylcellulose 500 MG Tabs tablet   Commonly known as:  CITRUCEL   Take 500 mg by mouth daily

## 2023-04-16 ENCOUNTER — HEALTH MAINTENANCE LETTER (OUTPATIENT)
Age: 58
End: 2023-04-16

## 2024-05-02 ENCOUNTER — LAB REQUISITION (OUTPATIENT)
Dept: LAB | Facility: CLINIC | Age: 59
End: 2024-05-02
Payer: COMMERCIAL

## 2024-05-02 DIAGNOSIS — D48.5 NEOPLASM OF UNCERTAIN BEHAVIOR OF SKIN: ICD-10-CM

## 2024-05-02 PROCEDURE — 88305 TISSUE EXAM BY PATHOLOGIST: CPT | Mod: TC,ORL | Performed by: DERMATOLOGY

## 2024-05-02 PROCEDURE — 88305 TISSUE EXAM BY PATHOLOGIST: CPT | Mod: 26 | Performed by: DERMATOLOGY

## 2024-05-06 LAB
PATH REPORT.COMMENTS IMP SPEC: NORMAL
PATH REPORT.COMMENTS IMP SPEC: NORMAL
PATH REPORT.FINAL DX SPEC: NORMAL
PATH REPORT.GROSS SPEC: NORMAL
PATH REPORT.MICROSCOPIC SPEC OTHER STN: NORMAL
PATH REPORT.RELEVANT HX SPEC: NORMAL

## (undated) DEVICE — GOWN REINFORCED XXLG 9071

## (undated) DEVICE — Device

## (undated) DEVICE — GLOVE PROTEXIS W/NEU-THERA 6.5  2D73TE65

## (undated) DEVICE — SPECIMEN BAG BEMIS HI FLOW SUCTION WHITE SOCK 533810

## (undated) DEVICE — STRAP KNEE/BODY 31143004

## (undated) DEVICE — SUCTION CANISTER BEMIS HI FLOW 006772-901

## (undated) DEVICE — SOL NACL 0.9% IRRIG 3000ML BAG 2B7477

## (undated) DEVICE — PAD CHUX UNDERPAD 30X36" P3036C

## (undated) DEVICE — SEAL SET MYOSURE ROD LENS SCOPE SINGLE USE 40-902

## (undated) DEVICE — GLOVE PROTEXIS BLUE W/NEU-THERA 6.5  2D73EB65

## (undated) DEVICE — LINEN TOWEL PACK X5 5464

## (undated) DEVICE — DECANTER TRANSFER DEVICE 2008S

## (undated) DEVICE — TUBING SYS AQUILEX BLUE INFLOW AQL-110 YLW OUTFLOW AQL-111

## (undated) DEVICE — LINEN GOWN X4 5410

## (undated) RX ORDER — FENTANYL CITRATE 50 UG/ML
INJECTION, SOLUTION INTRAMUSCULAR; INTRAVENOUS
Status: DISPENSED
Start: 2018-11-13

## (undated) RX ORDER — LIDOCAINE HYDROCHLORIDE 10 MG/ML
INJECTION, SOLUTION EPIDURAL; INFILTRATION; INTRACAUDAL; PERINEURAL
Status: DISPENSED
Start: 2018-11-13

## (undated) RX ORDER — PROPOFOL 10 MG/ML
INJECTION, EMULSION INTRAVENOUS
Status: DISPENSED
Start: 2018-11-13

## (undated) RX ORDER — VASOPRESSIN 20 U/ML
INJECTION PARENTERAL
Status: DISPENSED
Start: 2018-11-13

## (undated) RX ORDER — ACETAMINOPHEN 325 MG/1
TABLET ORAL
Status: DISPENSED
Start: 2018-11-13